# Patient Record
Sex: FEMALE | Race: WHITE | NOT HISPANIC OR LATINO | Employment: FULL TIME | URBAN - METROPOLITAN AREA
[De-identification: names, ages, dates, MRNs, and addresses within clinical notes are randomized per-mention and may not be internally consistent; named-entity substitution may affect disease eponyms.]

---
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
## 2018-01-03 ENCOUNTER — COMMUNICATION - HEALTHEAST (OUTPATIENT)
Dept: SCHEDULING | Facility: CLINIC | Age: 18
End: 2018-01-03

## 2018-01-04 ENCOUNTER — OFFICE VISIT - HEALTHEAST (OUTPATIENT)
Dept: FAMILY MEDICINE | Facility: CLINIC | Age: 18
End: 2018-01-04

## 2018-01-04 DIAGNOSIS — R09.81 NASAL CONGESTION: ICD-10-CM

## 2018-01-04 DIAGNOSIS — J06.9 VIRAL URI: ICD-10-CM

## 2018-01-04 DIAGNOSIS — L50.9 HIVES: ICD-10-CM

## 2018-04-03 ENCOUNTER — RECORDS - HEALTHEAST (OUTPATIENT)
Dept: ADMINISTRATIVE | Facility: OTHER | Age: 18
End: 2018-04-03

## 2018-04-09 ENCOUNTER — COMMUNICATION - HEALTHEAST (OUTPATIENT)
Dept: FAMILY MEDICINE | Facility: CLINIC | Age: 18
End: 2018-04-09

## 2019-01-14 ENCOUNTER — OFFICE VISIT - HEALTHEAST (OUTPATIENT)
Dept: FAMILY MEDICINE | Facility: CLINIC | Age: 19
End: 2019-01-14

## 2019-01-14 DIAGNOSIS — J45.909 ASTHMA: ICD-10-CM

## 2019-01-14 DIAGNOSIS — R10.2 PELVIC PAIN IN FEMALE: ICD-10-CM

## 2019-01-14 DIAGNOSIS — N91.2 AMENORRHEA: ICD-10-CM

## 2019-01-14 DIAGNOSIS — J45.21 MILD INTERMITTENT ASTHMA WITH EXACERBATION: ICD-10-CM

## 2019-01-14 LAB
ALBUMIN UR-MCNC: NEGATIVE MG/DL
APPEARANCE UR: CLEAR
BILIRUB UR QL STRIP: NEGATIVE
CLUE CELLS: NORMAL
COLOR UR AUTO: YELLOW
FSH SERPL-ACNC: 4.5 MIU/ML
GLUCOSE UR STRIP-MCNC: NEGATIVE MG/DL
HCG UR QL: NEGATIVE
HGB UR QL STRIP: NEGATIVE
KETONES UR STRIP-MCNC: ABNORMAL MG/DL
LEUKOCYTE ESTERASE UR QL STRIP: NEGATIVE
NITRATE UR QL: NEGATIVE
PH UR STRIP: 5.5 [PH] (ref 5–8)
PROLACTIN SERPL-MCNC: 13.6 NG/ML (ref 0–20)
SP GR UR STRIP: 1.03 (ref 1–1.03)
SP GR UR STRIP: >=1.03 (ref 1–1.03)
TRICHOMONAS, WET PREP: NORMAL
TSH SERPL DL<=0.005 MIU/L-ACNC: 1.4 UIU/ML (ref 0.3–5)
UROBILINOGEN UR STRIP-ACNC: ABNORMAL
YEAST, WET PREP: NORMAL

## 2019-01-14 RX ORDER — ALBUTEROL SULFATE 90 UG/1
AEROSOL, METERED RESPIRATORY (INHALATION)
Qty: 8.5 G | Refills: 0 | Status: SHIPPED | OUTPATIENT
Start: 2019-01-14

## 2019-01-15 ENCOUNTER — COMMUNICATION - HEALTHEAST (OUTPATIENT)
Dept: FAMILY MEDICINE | Facility: CLINIC | Age: 19
End: 2019-01-15

## 2019-01-15 LAB
C TRACH DNA SPEC QL PROBE+SIG AMP: NEGATIVE
N GONORRHOEA DNA SPEC QL NAA+PROBE: NEGATIVE

## 2019-01-17 LAB — TESTOST SERPL-MCNC: 26 NG/DL (ref 8–60)

## 2019-01-18 ENCOUNTER — COMMUNICATION - HEALTHEAST (OUTPATIENT)
Dept: FAMILY MEDICINE | Facility: CLINIC | Age: 19
End: 2019-01-18

## 2021-05-30 VITALS — WEIGHT: 221.4 LBS | HEIGHT: 65 IN | BODY MASS INDEX: 36.89 KG/M2

## 2021-05-30 VITALS — HEIGHT: 64 IN | WEIGHT: 224.06 LBS | BODY MASS INDEX: 38.25 KG/M2

## 2021-05-31 VITALS — BODY MASS INDEX: 39.04 KG/M2 | WEIGHT: 236.4 LBS

## 2021-06-02 VITALS — WEIGHT: 227.8 LBS | BODY MASS INDEX: 37.62 KG/M2

## 2021-06-09 NOTE — PROGRESS NOTES
ASSESSMENT:  1. Mild persistent asthma without complication  Patient with history of asthma not under control at this time. Last fall we attempt to start her on a steroid inhaler and she could not afford it. We contacted insurance company and found the ones that work on the formulary but the co-pay was still over $80. Patient currently is using albuterol as needed. Allergies seem to be her bring trigger for asthma sore for her to allergy clinic for testing and possible treatment. Discussed case with Pharm.D. Jalil Burrell and he recommended low dose Symbicort so will have her use Symbicort one puff daily and he had a sample to offer her.  - Ambulatory referral to Allergy    2. Encounter for other contraceptive management  Patient with a missed menses will check a urine pregnancy test today. We discussed other forms birth control. Patient does not feel she can reliably take a pill that daily. She is unwilling to place a maneuvering vaginally and does not want anything inside her. She refuses to consider the IUD. Also is not interested in Nexplanon. Is willing to try the Depo-Provera shot. If pregnancy test is negative today will start double at this point she'll follow-up in 3 months for repeat injection  - medroxyPROGESTERone injection 150 mg (DEPO-PROVERA); Inject 1 mL (150 mg total) into the shoulder, thigh, or buttocks once.  - medroxyPROGESTERone injection 150 mg (DEPO-PROVERA); Inject 1 mL (150 mg total) into the shoulder, thigh, or buttocks every 3 (three) months.    3. Missed menses  Missed menses pregnancy test today was negative  - Pregnancy (Beta-hCG, Qual), Urine    4. Allergic rhinitis  Patient with a history of allergic triggers for her asthma. Will refer her to allergy clinic for testing and possible treatment suggestions  - Ambulatory referral to Allergy    PLAN:  There are no Patient Instructions on file for this visit.    Orders Placed This Encounter   Procedures     Pregnancy (Beta-hCG, Qual), Urine  "    Ambulatory referral to Allergy     Referral Priority:   Routine     Referral Type:   Consultation     Referral Reason:   Evaluation and Treatment     Requested Specialty:   Allergy     Number of Visits Requested:   1     Medications Discontinued During This Encounter   Medication Reason     mometasone 200 mcg/actuation HFAA Therapy completed     fluticasone (FLOVENT HFA) 110 mcg/actuation inhaler Therapy completed     Administrations This Visit     medroxyPROGESTERone injection 150 mg (DEPO-PROVERA)     Admin Date Action Dose Route Administered By             04/06/2017 Given 150 mg Intramuscular Ava Melissa CMA                        No Follow-up on file.    CHIEF COMPLAINT:  Chief Complaint   Patient presents with     Contraception     Discuss contraception options; Hx OCP use- Pt non compliant      Contraception     Current use ortho evra patch, \"patch is gross\" per Pt       HISTORY OF PRESENT ILLNESS:  Sherrill is a 16 y.o. female presenting to the clinic today to discuss contraception options. She is accompanied by her male partner.    Contraception: She is currently prescribed the Ortho Evra patch for contraception. She claims it itches often. She has tried applying it to both buttocks and her shoulder without improvement. She also notes it tends to peel off while in the shower. She has not been using her patch recently due to the itchiness. She is supposed to be on her menstrual period at this time but it has not yet started. She notes her menstrual period has consistently been a couple days late since beginning birth control. She denies any mood swings or changes while previously on the pill or patch. She uses tampons and occasionally has anxiety about using them so she does not want to use NuvaRing or an IUD. She is agreeable to receiving an injection every 3 months.  Pt menses is late this month.    Asthma: Her asthma has not been well-controlled. She has not been using her controller inhalers " "regularly. She tried taking Singulair last fall but discontinued use because it caused an itchy rash. She was prescribed Flovent but never began taking it because she could not afford it. She usually develops allergies in the spring. She last saw an allergist 6 years ago but has never received allergy injections. She currently takes cetirizine daily with mild relief.    ACT Total Score: 17 (4/6/2017  9:00 AM)    REVIEW OF SYSTEMS:   She has a history of anxiety. All other systems are negative.    PFSH:  No other pertinent history reviewed.    No past medical history on file.    Family History   Problem Relation Age of Onset     Asthma Mother      Asthma Brother      Asthma Maternal Grandmother      Cancer Maternal Grandfather      skin cancer     No past surgical history on file.    TOBACCO USE:  History   Smoking Status     Current Some Day Smoker   Smokeless Tobacco     Not on file     Comment: parents     VITALS:  Vitals:    04/06/17 0934   BP: 118/64   Pulse: 72   Resp: 16   Weight: (!) 224 lb 1 oz (101.6 kg)   Height: 5' 4.25\" (1.632 m)     Wt Readings from Last 3 Encounters:   04/06/17 (!) 224 lb 1 oz (101.6 kg) (99 %, Z= 2.24)*   11/02/16 205 lb (93 kg) (98 %, Z= 2.04)*   08/24/16 193 lb 8 oz (87.8 kg) (97 %, Z= 1.89)*     * Growth percentiles are based on [facility] 2-20 Years data.     Body mass index is 38.16 kg/(m^2).    PHYSICAL EXAM:  General Appearance: Alert, cooperative, no distress, appears stated age  Neck: Supple, symmetrical, trachea midline, no adenopathy;  thyroid: not enlarged, symmetric, no tenderness/mass/nodules  Lungs: Clear to auscultation bilaterally, respirations unlabored  Heart: Regular rate and rhythm, S1 and S2 normal, no murmur, rub, or gallop  Lymph nodes: Cervical, supraclavicular, and axillary nodes normal  Psychiatric: Normal mood and affect.     ADDITIONAL HISTORY SUMMARIZED (2): None.  DECISION TO OBTAIN EXTRA INFORMATION (1): Consulted pharmacist regarding steroid inhaler. "   RADIOLOGY TESTS (1): None.  LABS (1): Ordered lab.  MEDICINE TESTS (1): None.  INDEPENDENT REVIEW (2 each): None.    The visit lasted a total of 20 minutes face to face with the patient. Over 50% of the time was spent counseling and educating the patient about her asthma management and contraception options.    ITariq, am scribing for and in the presence of, Dr. Posadas.    I, Dr. Posadas, personally performed the services described in this documentation, as scribed by Tariq Colby in my presence, and it is both accurate and complete.    MEDICATIONS:  Current Outpatient Prescriptions   Medication Sig Dispense Refill     cetirizine (ZYRTEC) 10 MG tablet Take by mouth.       hydrOXYzine (VISTARIL) 25 MG capsule Take 1 capsule (25 mg total) by mouth bedtime as needed for itching or allergies. 10 capsule 0     norelgestromin-ethinyl estradiol (ORTHO EVRA) 150-35 mcg/24 hr Place 1 patch on the skin every 7 days. 4 patch 11     PROAIR HFA 90 mcg/actuation inhaler INHALE TWO PUFFS BY MOUTH EVERY 4 HOURS AS NEEDED FOR WHEEZING, AND INHALE TWO PUFFS BEFORE CHOIR 8.5 g 0     Current Facility-Administered Medications   Medication Dose Route Frequency Provider Last Rate Last Dose     medroxyPROGESTERone injection 150 mg (DEPO-PROVERA)  150 mg Intramuscular Q3 Months Kate Posadas MD           Total data points: 2

## 2021-06-10 NOTE — PROGRESS NOTES
Assessment:    Allergic rhinitis with a specific sensitivity to birch pollen (spring), grass pollen ( summer)    Persistent asthma with incomplete control however normal spirometry and nitric oxide today.    Oral allergy syndrome    Chronic hives.  Because of seasonal association these may be related to outdoor aeroallergens.    Plan:    For Allergies, will switch nasal spray medication to Astelin 2 sprays each nostril morning and night.      For asthma, will try Symbicort 80-2 sprays twice daily.  Albuterol 2 puffs every 4 hours as needed    For hives, recommend cetirizine 10 mg or fexofenadine 180 mg morning and night.    Allergy shots can be considered however I would recommend additional intradermal allergy testing.  ____________________________________________________________________________     Sherrill is here for evaluation of allergies and asthma.  She reports having a long history starting in early childhood.  She feels that her asthma is getting worse.  It typically is worse during the spring season.  She is having active allergy symptoms including itchy watery eyes, rhinorrhea, sneezing and nasal congestion.  Her allergies are worse in the spring.   she had previous allergy testing done in 2012 which was positive for birch and grass pollen only.  She does feel that her allergies are year round however.  She uses fexofenadine 180 mg daily which helps.  She is using fluticasone nasal spray daily which she started 1 month ago.  Regarding asthma she describes shortness of breath.  Minimal coughing and wheezing.  She identifies laughter, exercise, allergies and illness as triggers.  No history of hospitalizations with asthma.  She has albuterol which she is using nearly daily.  Previously tried montelukast cast but she reported this caused hives and itching.  She has a sample of Symbicort 80 that she is doing 1 inhalation daily of.  She has a history of chronic hives.  Over the past 3-4 years she gets hives in  the spring.  Typically she will go to the emergency room and get placed on prednisone once a year.  She describes blotchy raised itchy rashes that come and go all over her body.  They do respond somewhat to antihistamines.  She describes pineapple causing itchy mouth symptoms.  It does not cause hives.    Review of symptoms:  As above, otherwise negative    Past medical history: No other chronic medical conditions noted.    Allergies: No known allergies to medication, latex, or hymenoptera venom     Family history: Mother and grandmother with allergies and asthma.  Social history: Currently she describes her house as old.  She has been in this home for 6 years.  No I t has central air-conditioning.  There is a basement.  She has cats and a dog in the home.  She has had these for years.  No cigarette smoking history.    Medications: reviewed in chart  Physical Exam:  General:  Alert and oriented.  Eyes:  Sclera clear.  Ears: TMs translucent grey with bony landmarks visible. Nose: Pale, boggy mucosal membranes.  Throat: Pink, moist.  No lesions.  Neck: Supple.  No lymphadenopathy.  Lungs: CTA.  CV: Regular rate and rhythm. Extremities: Well perfused.  No clubbing or cyanosis. Skin: No rash    Nitric oxide is 13 ppb    Spirometry: FEV1 to FVC ratio is 84%.  FEV1 is 3.4 L which is 99% of predicted.  FVC is 4 L which is 104% of predicted.  This is normal spirometry    Last Percutaneous Allergy Test Results  Trees  Nitin, White  1:20 H  (W/F in mm): 0/0 (04/13/17 1038)  Birch Mix 1:20 H (W/F in mm): 4/F (04/13/17 1038)  Burt, Common 1:20 H (W/F in mm): 0/0 (04/13/17 1038)  Elm, American 1:20 H (W/F in mm): 0/0 (04/13/17 1038)  Ferry, Shagbark 1:20 H (W/F in mm): 0/0 (04/13/17 1038)  Maple, Hard/Sugar 1:20 H (W/F in mm): 0/0 (04/13/17 1038)  [place] Mix 1:20 H (W/F in mm): 0/0 (04/13/17 1038)  Vilonia, Red 1:20 H (W/F in mm): 0/0 (04/13/17 1038)  Rohit Hamilton 1:20 H (W/F in mm): 0/0 (04/13/17 1038)  [place] Tree  1:20 H (W/F in mm): 0/0 (04/13/17 1038)  Dust Mites  D. Pteronyssinus Mite 30,000 AU/ML H (W/F in mm): 0/0 (04/13/17 1038)  D. Farinae Mite 30,000 AU/ML H (W/F in mm: 0/0 (04/13/17 1038)  Grasses  Grass Mix #4 10,000 BAU/ML H: 3/F (04/13/17 1038)  John Grass 1:20 H (W/F in mm): 0/0 (04/13/17 1038)  Cockroach  Cockroach Mix 1:10 H (W/F in mm): 0/0 (04/13/17 1038)  Molds/Fungi  Alternaria Tenuis 1:10 H (W/F in mm): 0/0 (04/13/17 1038)  Aspergillus Fumigatus 1:10 H (W/F in mm): 0/0 (04/13/17 1038)  Homodendrum Cladosporioides 1:10 H (W/F in mm): 0/0 (04/13/17 1038)  Penicillin Notatum 1:10 H (W/F in mm): 0/0 (04/13/17 1038)  Epicoccum 1:10 H (W/F in mm): 0/0 (04/13/17 1038)  Weeds  Ragweed, Short 1:20 H (W/F in mm): 0/0 (04/13/17 1038)  Dock, Sorrel 1:20 H (W/F in mm): 0/0 (04/13/17 1038)  Lamb's Quarter 1:20 H (W/F in mm): 0/0 (04/13/17 1038)  Pigweed, Rough Red Root 1:20 H  (W/F in mm): 0/0 (04/13/17 1038)  Plantain, English 1:20 H  (W/F in mm): 0/0 (04/13/17 1038)  Sagebrush, Mugwort 1:20 H  (W/F in mm): 0/0 (04/13/17 1038)  Animal  Cat 10,000 BAU/ML H (W/F in mm): 0/0 (04/13/17 1038)  Dog 1:10 H (W/F in mm): 0/0 (04/13/17 1038)  Controls  Device Type: QUINTIP (04/13/17 1038)  Neg. control: 50% Glycerine/Saline H (W/F in mm): 0/0 (04/13/17 1038)  Pos. control: Histamine 6mg/ML (W/F in mms): 8/18 (04/13/17 1038)     This transcription uses voice recognition software, which may contain typographical errors.   45 min spent in direct contact with the patient.  More than 50% in counseling and coordination of care.

## 2021-06-23 NOTE — TELEPHONE ENCOUNTER
Reason contacted:  Results  Information relayed:  MD message below  Additional questions:  No  Further follow-up needed:  No  Okay to leave a detailed message:  No

## 2021-06-23 NOTE — TELEPHONE ENCOUNTER
Left message to call back for: Test results/Lab results   Information to relay to patient:  Please relay message below from provider to patient

## 2021-06-23 NOTE — TELEPHONE ENCOUNTER
Please call patient and let her know so far all of her tests are normal.  Her Pap smear is actually back and was normal and she can repeat that in 3 years.  The only test we are waiting for his testosterone.  No evidence of infection at this time.    As a note before said please call her cell phone and did not leave any messages with her mother

## 2021-06-23 NOTE — TELEPHONE ENCOUNTER
Left message to call back for: Sherrill  Information to relay to patient:  Left message stating that not all the results are back and we will call her cell phone when results are back and Dr Posadas has reviewed them.

## 2021-06-23 NOTE — TELEPHONE ENCOUNTER
----- Message from Kate Posadas MD sent at 1/17/2019  5:50 PM CST -----  Sherrill  Your testosterone level was normal.  Your hormonal levels were normal.  And did not give us a cause for your menstrual irregularities

## 2021-06-23 NOTE — TELEPHONE ENCOUNTER
Test Results  Who is calling?:  Patient  Who ordered the test:  Kate Posadas MD  Type of test: Lab  Date of test:  1/14/2019  Where was the test performed:  Lab  What are your questions/concerns?:  Patient wanting to know lab results from yesterday and to ensure we contact her and not her mother.  Please contact patient when all lab results are back as writer let patient know some are still in process.  Okay to leave a detailed message?:  No - call [phone]

## 2021-06-23 NOTE — TELEPHONE ENCOUNTER
Patient Returning Call  Reason for call:  Results  Information relayed to patient:  Patient was read the following by writer per PCP: Please call patient and let her know so far all of her tests are normal.  Her Pap smear is actually back and was normal and she can repeat that in 3 years.  The only test we are waiting for his testosterone.  No evidence of infection at this time.  Patient has additional questions:  No  If YES, what are your questions/concerns:  NA  Okay to leave a detailed message?: No call back needed

## 2021-06-23 NOTE — PROGRESS NOTES
Assessment & Plan:  1. Amenorrhea  Patient with irregular menses.  No previous evaluation for.  Will start initial labs looking at hormonal levels, rule out pregnancy.  Patient has not had a Pap smear yet that was done today.  If everything is normal consider a progesterone challenge.  - Influenza, Seasonal,Quad Inj, 36+ MOS  - Pregnancy (Beta-hCG, Qual), Urine  - Thyroid Cascade  - Follicle Stimulating Hormone (FSH)  - Prolactin  - Gynecologic Cytology (PAP Smear)  - Testosterone,Total(TEST<21yr)    2. Pelvic pain in female  Patient with pelvic pain that is now resolved.  Check a urinalysis.  A wet prep and GC negative.  Patient to monitor symptoms if they do recur  - Urinalysis-UC if Indicated  - Chlamydia trachomatis & Neisseria gonorrhoeae, Amplified Detection  - Wet Prep, Vaginal    3. Asthma  Patient with a history of asthma needs a refill of her albuterol.  She states she has her steroid inhaler at home right now and will continue with it.  - albuterol (PROAIR HFA) 90 mcg/actuation inhaler; INHALE TWO PUFFS BY MOUTH EVERY 4 HOURS AS NEEDED FOR WHEEZING, AND INHALE TWO PUFFS BEFORE CHOIR  Dispense: 8.5 g; Refill: 0    4. Mild intermittent asthma with exacerbation  Patient with abnormal lung exam consistent with an asthma exacerbation.  Will refill her albuterol inhaler and will treat her with Z-Ash.  Patient is to follow-up if no improvement of symptoms in 48 hours.  - albuterol (PROAIR HFA) 90 mcg/actuation inhaler; INHALE TWO PUFFS BY MOUTH EVERY 4 HOURS AS NEEDED FOR WHEEZING, AND INHALE TWO PUFFS BEFORE CHOIR  Dispense: 8.5 g; Refill: 0  - azithromycin (ZITHROMAX Z-ASH) 250 MG tablet; 2 tabs (500 mg ) day #1, then 1 tab (250 mg) days #2-5, total 5 days  Dispense: 6 tablet; Refill: 0        Orders Placed This Encounter   Procedures     Chlamydia trachomatis & Neisseria gonorrhoeae, Amplified Detection     Order Specific Question:   Specimen Source?     Answer:   Endocervix     Wet Prep, Vaginal      Influenza, Seasonal,Quad Inj, 36+ MOS     Pregnancy (Beta-hCG, Qual), Urine     Thyroid Cascade     Follicle Stimulating Hormone (FSH)     Prolactin     Urinalysis-UC if Indicated     Testosterone,Total(TEST<21yr)     Medications Discontinued During This Encounter   Medication Reason     hydrOXYzine pamoate (VISTARIL) 25 MG capsule Therapy completed     pseudoephedrine (SUDAFED) 30 MG tablet Therapy completed     PROAIR HFA 90 mcg/actuation inhaler Reorder       Return in about 3 months (around 4/14/2019) for Recheck.          This note was created use Dragon Dictation.  There may be sound alike errors present.       Chief Complaint:   Chief Complaint   Patient presents with     Vaginal Pain     vaginal cramping, x 3 days, pt states pain has subsided now     Wheezing     pt c/o vomiting over the weekend, having wheezing in chest, check lungs today       History of Present Illness:  Sherrill is a 18 y.o. female presenting to the clinic today for numerous complaints.  First she had some pelvic cramping last week.  It lasted about 3 days.  It hurts to walk her to have intercourse.  She denies any discharge no odor and it is now gone.  Since then she has had a little bit of brown spotting.  Patient is in a monogamous relationship and does not feel she has been exposed to chlamydia or gonorrhea    Since stopping the Depo shot patient has not had a menses.  Patient states she has never had regular menses.  But since the Dep0 she is not had anything regular.  She does not desire to get pregnant at this time but is not currently using any contraception.   missed menses.  she is also had no evaluation of the missed menses     patient also has a history of asthma.  She has run out of her albuterol inhaler but continues to have her steroid inhaler.  She does not take inhaler regularly only as needed.  Patient states that she has been having more wheezing recently as well as some cough and shortness of breath.  Her asthma is  usually triggered with URI symptoms.      Reviewed  Review of Systems:  All other systems are negative.     PFSH:  Reviewed    Tobacco Use:  Social History     Tobacco Use   Smoking Status Former Smoker   Smokeless Tobacco Never Used   Tobacco Comment    passive smoke exposure       Vitals:  Vitals:    01/14/19 1329   BP: 115/76   Patient Site: Left Arm   Patient Position: Sitting   Cuff Size: Adult Large   Pulse: (!) 103   Resp: 16   Temp: 98.6  F (37  C)   TempSrc: Oral   SpO2: 98%   Weight: (!) 227 lb 12.8 oz (103.3 kg)     Wt Readings from Last 3 Encounters:   01/14/19 (!) 227 lb 12.8 oz (103.3 kg)   01/04/18 (!) 236 lb 6.4 oz (107.2 kg) (>99 %, Z= 2.38)*   04/13/17 (!) 221 lb 6.4 oz (100.4 kg) (99 %, Z= 2.22)*     * Growth percentiles are based on [facility] (Girls, 2-20 Years) data.     Body mass index is 37.62 kg/m .      Physical Exam:  Constitutional:  Reveals an alert, cooperative,  female in no acute distress.  Vitals:  Per nursing notes.  Ears:  Clear.  Oropharynx:  Without posterior nasal drainage or thrush.  Neck:  Supple, no lymphadenopathy,  thyroid not palpable.  Cardiac:  Regular rate and rhythm without murmurs, rubs, or gallops.   Lungs: rhonci left upper lobe some expiratory wheezing throughout lung fields       Abdomen:  non-tender, non-distended.  Neither liver nor spleen palpable.   - no discha dischargerge, wet prep and pap.  Cervix normal.  Non tender on exam - ovaries normal.   Psychiatric:  Mood appropriate, memory intact.     Data Reviewed:  Additional history summarized (from old records or history from someone other than the patient or another healthcare provider) (2 TOTAL): None.     Decision to obtain extra information (old records requested or history from another person or accessing Care Everywhere) (1 TOTAL): None.     Radiology tests summarized or ordered (XRAY/CT/MRI/DXA) (1 TOTAL): None.    Labs reviewed or ordered (1 TOTAL): 1 labs ordered.    Medicine tests summarized or ordered  (EKG/ECHO) (1 TOTAL): None.    Independent review of EKG or X-Ray (2 EACH): None.       The visit lasted a total of 26 minutes face to face with the patient. Over 50% of the time was spent counseling and educating the patient about menstrual irregularity.        Medications:  Current Outpatient Medications   Medication Sig Dispense Refill     albuterol (PROAIR HFA) 90 mcg/actuation inhaler INHALE TWO PUFFS BY MOUTH EVERY 4 HOURS AS NEEDED FOR WHEEZING, AND INHALE TWO PUFFS BEFORE CHOIR 8.5 g 0     budesonide-formoterol (SYMBICORT) 80-4.5 mcg/actuation inhaler Inhale 2 puffs 2 (two) times a day. 1 Inhaler 6     cetirizine (ZYRTEC) 10 MG tablet Take by mouth.       azithromycin (ZITHROMAX Z-ASH) 250 MG tablet 2 tabs (500 mg ) day #1, then 1 tab (250 mg) days #2-5, total 5 days 6 tablet 0     No current facility-administered medications for this visit.        Total Data Points:

## 2022-09-12 ENCOUNTER — LAB REQUISITION (OUTPATIENT)
Dept: LAB | Facility: CLINIC | Age: 22
End: 2022-09-12
Payer: COMMERCIAL

## 2022-09-12 DIAGNOSIS — Z12.4 ENCOUNTER FOR SCREENING FOR MALIGNANT NEOPLASM OF CERVIX: ICD-10-CM

## 2022-09-12 DIAGNOSIS — N91.5 OLIGOMENORRHEA, UNSPECIFIED: ICD-10-CM

## 2022-09-12 DIAGNOSIS — Z13.1 ENCOUNTER FOR SCREENING FOR DIABETES MELLITUS: ICD-10-CM

## 2022-09-12 LAB
HBA1C MFR BLD: 5.6 %
HOLD SPECIMEN: NORMAL
TSH SERPL DL<=0.005 MIU/L-ACNC: 2.81 UIU/ML (ref 0.3–4.2)

## 2022-09-12 PROCEDURE — 84270 ASSAY OF SEX HORMONE GLOBUL: CPT | Mod: ORL | Performed by: OBSTETRICS & GYNECOLOGY

## 2022-09-12 PROCEDURE — 84146 ASSAY OF PROLACTIN: CPT | Mod: ORL | Performed by: OBSTETRICS & GYNECOLOGY

## 2022-09-12 PROCEDURE — 83498 ASY HYDROXYPROGESTERONE 17-D: CPT | Mod: ORL | Performed by: OBSTETRICS & GYNECOLOGY

## 2022-09-12 PROCEDURE — G0145 SCR C/V CYTO,THINLAYER,RESCR: HCPCS | Mod: ORL | Performed by: OBSTETRICS & GYNECOLOGY

## 2022-09-12 PROCEDURE — 82627 DEHYDROEPIANDROSTERONE: CPT | Mod: ORL | Performed by: OBSTETRICS & GYNECOLOGY

## 2022-09-12 PROCEDURE — 84443 ASSAY THYROID STIM HORMONE: CPT | Mod: ORL | Performed by: OBSTETRICS & GYNECOLOGY

## 2022-09-12 PROCEDURE — 84403 ASSAY OF TOTAL TESTOSTERONE: CPT | Mod: ORL | Performed by: OBSTETRICS & GYNECOLOGY

## 2022-09-12 PROCEDURE — 83036 HEMOGLOBIN GLYCOSYLATED A1C: CPT | Mod: ORL | Performed by: OBSTETRICS & GYNECOLOGY

## 2022-09-13 LAB
DHEA-S SERPL-MCNC: 192 UG/DL (ref 35–430)
PROLACTIN SERPL 3RD IS-MCNC: 16 NG/ML (ref 5–23)
SHBG SERPL-SCNC: 27 NMOL/L (ref 30–135)

## 2022-09-14 LAB
BKR LAB AP GYN ADEQUACY: NORMAL
BKR LAB AP GYN INTERPRETATION: NORMAL
BKR LAB AP HPV REFLEX: NORMAL
BKR LAB AP LMP: NORMAL
BKR LAB AP PREVIOUS ABNL DX: NORMAL
BKR LAB AP PREVIOUS ABNORMAL: NORMAL
PATH REPORT.COMMENTS IMP SPEC: NORMAL
PATH REPORT.COMMENTS IMP SPEC: NORMAL
PATH REPORT.RELEVANT HX SPEC: NORMAL

## 2022-09-15 LAB
TESTOST FREE SERPL-MCNC: 1.07 NG/DL
TESTOST SERPL-MCNC: 53 NG/DL (ref 8–60)

## 2022-09-20 LAB — 17OHP SERPL-MCNC: 34 NG/DL

## 2023-05-24 ENCOUNTER — HOSPITAL ENCOUNTER (OUTPATIENT)
Dept: RADIOLOGY | Facility: HOSPITAL | Age: 23
Discharge: HOME | End: 2023-05-24
Attending: STUDENT IN AN ORGANIZED HEALTH CARE EDUCATION/TRAINING PROGRAM | Admitting: RADIOLOGY
Payer: COMMERCIAL

## 2023-05-24 PROCEDURE — 58340 CATHETER FOR HYSTEROGRAPHY: CPT

## 2023-05-24 PROCEDURE — 255N000002 HC RX 255 OP 636: Performed by: STUDENT IN AN ORGANIZED HEALTH CARE EDUCATION/TRAINING PROGRAM

## 2023-05-24 RX ADMIN — IOHEXOL 30 ML: 300 INJECTION, SOLUTION INTRAVENOUS at 14:04

## 2023-10-05 ENCOUNTER — OFFICE VISIT (OUTPATIENT)
Dept: SURGERY | Facility: CLINIC | Age: 23
End: 2023-10-05
Payer: COMMERCIAL

## 2023-10-05 ENCOUNTER — LAB (OUTPATIENT)
Dept: LAB | Facility: CLINIC | Age: 23
End: 2023-10-05
Payer: COMMERCIAL

## 2023-10-05 VITALS
HEIGHT: 64 IN | BODY MASS INDEX: 40.55 KG/M2 | SYSTOLIC BLOOD PRESSURE: 124 MMHG | WEIGHT: 237.5 LBS | DIASTOLIC BLOOD PRESSURE: 82 MMHG

## 2023-10-05 DIAGNOSIS — E66.01 MORBID OBESITY (H): ICD-10-CM

## 2023-10-05 DIAGNOSIS — E66.01 MORBID OBESITY (H): Primary | ICD-10-CM

## 2023-10-05 DIAGNOSIS — N97.9 FEMALE INFERTILITY: ICD-10-CM

## 2023-10-05 DIAGNOSIS — E28.2 PCOS (POLYCYSTIC OVARIAN SYNDROME): ICD-10-CM

## 2023-10-05 LAB
ALBUMIN SERPL BCG-MCNC: 4.6 G/DL (ref 3.5–5.2)
ALP SERPL-CCNC: 75 U/L (ref 35–104)
ALT SERPL W P-5'-P-CCNC: 21 U/L (ref 0–50)
ANION GAP SERPL CALCULATED.3IONS-SCNC: 11 MMOL/L (ref 7–15)
AST SERPL W P-5'-P-CCNC: 27 U/L (ref 0–45)
BILIRUB SERPL-MCNC: 0.3 MG/DL
BUN SERPL-MCNC: 16 MG/DL (ref 6–20)
CALCIUM SERPL-MCNC: 9.4 MG/DL (ref 8.6–10)
CHLORIDE SERPL-SCNC: 105 MMOL/L (ref 98–107)
CREAT SERPL-MCNC: 0.94 MG/DL (ref 0.51–0.95)
DEPRECATED HCO3 PLAS-SCNC: 25 MMOL/L (ref 22–29)
EGFRCR SERPLBLD CKD-EPI 2021: 86 ML/MIN/1.73M2
GLUCOSE SERPL-MCNC: 100 MG/DL (ref 70–99)
POTASSIUM SERPL-SCNC: 3.7 MMOL/L (ref 3.4–5.3)
PROT SERPL-MCNC: 7.5 G/DL (ref 6.4–8.3)
SODIUM SERPL-SCNC: 141 MMOL/L (ref 135–145)

## 2023-10-05 PROCEDURE — 36415 COLL VENOUS BLD VENIPUNCTURE: CPT

## 2023-10-05 PROCEDURE — 99205 OFFICE O/P NEW HI 60 MIN: CPT | Performed by: FAMILY MEDICINE

## 2023-10-05 PROCEDURE — 80053 COMPREHEN METABOLIC PANEL: CPT

## 2023-10-05 RX ORDER — METFORMIN HCL 500 MG
500 TABLET, EXTENDED RELEASE 24 HR ORAL DAILY
COMMUNITY
End: 2024-09-17

## 2023-10-05 RX ORDER — CETIRIZINE HYDROCHLORIDE 10 MG/1
20 TABLET ORAL DAILY
COMMUNITY

## 2023-10-05 RX ORDER — ALBUTEROL SULFATE 90 UG/1
2 AEROSOL, METERED RESPIRATORY (INHALATION) EVERY 6 HOURS PRN
COMMUNITY
End: 2024-09-17

## 2023-10-05 RX ORDER — PHENTERMINE HYDROCHLORIDE 37.5 MG/1
TABLET ORAL
Qty: 90 TABLET | Refills: 0 | Status: SHIPPED | OUTPATIENT
Start: 2023-10-05 | End: 2024-09-17

## 2023-10-05 RX ORDER — VITAMIN B COMPLEX
2000 TABLET ORAL DAILY
COMMUNITY
End: 2024-09-17

## 2023-10-05 RX ORDER — FLUTICASONE PROPIONATE 50 MCG
SPRAY, SUSPENSION (ML) NASAL
COMMUNITY
Start: 2023-04-26 | End: 2024-09-17

## 2023-10-05 NOTE — PROGRESS NOTES
HPI:  Sherrill Du is a 23 year old year old female who I was asked to see by Dr. Chaudhari for medical bariatric consultation. Weight related co-morbidities include There is no problem list on file for this patient.  .  Her BMI is Body mass index is 41.41 kg/m ..      Assessment/ Plan:  Sherrill is a 23 year old year old female who presents for medical weight management.     Diagnosis Comments   1. Morbid obesity (H)  Comprehensive metabolic panel  We discussed healthy habits to assist with weight loss. She will work on planning meals ahead of time using the plate method for portion control and macronutrient proportions. She will continue using LoseIt to journal. She will try to add some muscle building activity. We discussed medication that may assist with weight loss. phentermine was prescribed. Risks/ benefits and possible side effects were discussed and questions were answered. Written information was given.        2. PCOS (polycystic ovarian syndrome)  This may improve with healthy habits and weight loss.       3. Female infertility  This may improve with healthy habits and weight loss.             Follow up: in 1 month with the dietician and in 3 months with myself       Total time spent on the date of this encounter doing: chart review, review of test results, patient visit, physical exam, education, counseling, developing plan of care and documenting = 66 minutes.     RADHA Barker MD  [facility]          Counseling:  We discussed [facility] Bariatric Basics including:  -eating 3 meals daily  -eating protein first  -eating slowly, chewing food well  -avoiding/limiting calorie containing beverages  -choosing wheat, not white with breads, crackers, pastas, berenice, bagels, tortillas, rice  -limiting carbohydrates in general  -limiting restaurant or cafeteria eating to twice a week or less    We discussed the importance of restorative sleep and stress management in maintaining a healthy  weight.    We reviewed medications associated with weight gain.    We discussed insulin resistance and glycemic index as it relates to appetite and weight control.     We discussed the National Weight Control Registry healthy weight maintenance strategies and ways to optimize metabolism.  We discussed the importance of physical activity including cardiovascular and strength training in maintaining a healthier weight and explored viable options.        History Surrouding Consultation:  She has had several past supervised and unsupervised weight loss attempts  Unfortunately there was not durable weight maintenance.  History of bulimia, anorexia, or binge eating disorder? no  If Present has eating disorder been in remission at least 3 years? na    She has lost weight over the past 2 months. She has been making better choices with what she eats. Portions have also decreased.    Dietary History  Meals per day: 3  Snacks: snacks with kids at   Typical Snack: beef stick and yogurt, crackers    A typical meal includes: B: egg sandwich (english muffin and egg and Angolan vaughan and cheese) or egg burrito or oatmeal and fruit  L: chicken and rice and cauliflower rice and broccoli or salad (usually protein and vegetable)  D: protein and vegetable and starch  Sugared beverages: starbucks coffee 0-1 per week  Caffeine: Celcius  Amount of restaurant eating per week: 0-1  Water per day: working on it, now  oz per day    Physical Activity Patterns  Current physical activity routine includes: She works 2 jobs: at a  and at a [employer] place, she gets over 10,000 steps    Limitations from being physically active on a regular basis includes: time        PMH:No past medical history on file.    Past Surgical Hx:No past surgical history on file.    Medication:  Current Outpatient Medications   Medication    albuterol (PROAIR HFA/PROVENTIL HFA/VENTOLIN HFA) 108 (90 Base) MCG/ACT inhaler    cetirizine (ZYRTEC) 10 MG tablet     fluticasone (FLONASE) 50 MCG/ACT nasal spray    metFORMIN (GLUCOPHAGE XR) 500 MG 24 hr tablet    Prenatal Vit-Fe Fumarate-FA (PRENATAL MULTIVITAMIN  PLUS IRON) 27-1 MG TABS    UNABLE TO FIND    Vitamin D3 (VITAMIN D, CHOLECALCIFEROL,) 25 mcg (1000 units) tablet     No current facility-administered medications for this visit.       Allergies:Patient has no known allergies.    Family Hx:No family history on file.    Social Hx:  Social History     Socioeconomic History    Marital status:      Spouse name: Not on file    Number of children: Not on file    Years of education: Not on file    Highest education level: Not on file   Occupational History    Not on file   Tobacco Use    Smoking status: Not on file    Smokeless tobacco: Not on file   Substance and Sexual Activity    Alcohol use: Not on file    Drug use: Not on file    Sexual activity: Not on file   Other Topics Concern    Not on file   Social History Narrative    Not on file     Social Determinants of Health     Financial Resource Strain: Not on file   Food Insecurity: Not on file   Transportation Needs: Not on file   Physical Activity: Not on file   Stress: Not on file   Social Connections: Not on file   Interpersonal Safety: Not on file   Housing Stability: Not on file       Tobacco Use Hx:  History   Smoking Status    Not on file   Smokeless Tobacco    Not on file       ROS  General  Fatigue: late afternoon  Sleep: 5-6 hours per night  Sleep Quality:good  HEENT  Hx of glaucoma: no  Vision changes: no  Cardiovascular  Chest Pain with Exertion: no  Palpitations: no  Hx of heart disease: no  Pulmonary  Shortness of breath at rest: no  Shortness of breath with exertion: yes  Snoring: yes  Stop-bang score: not done  Millbury Score: not done  Psychiatric  Moods Stable: some irritability  Endocrine  Polydipsia: no  Personal or family history of medullary thyroid cancer: no  Neurologic:  Hx of seizures: no    History of kidney stones: no  History of  "kidney disease: no  Current birth control: none    /82 (BP Location: Right arm, Patient Position: Sitting)   Ht 1.613 m (5' 3.5\")   Wt 107.7 kg (237 lb 8 oz)   BMI 41.41 kg/m    Wt Readings from Last 2 Encounters:   10/05/23 107.7 kg (237 lb 8 oz)     Body mass index is 41.41 kg/m .        Physical Exam:    Wt Readings from Last 4 Encounters:   10/05/23 107.7 kg (237 lb 8 oz)          GEN: Alert and oriented in no acute distress.   HEENT: mucous membranes moist  CV: RRR no MRG  ABDOMEN: mild protuberance     Labs:    No results found for: WBC, HGB, HCT, MCV, PLT  No results found for: CHOL  No results found for: HDL  No components found for: LDLCALC  No results found for: TRIG  No results found for: CHOLHDL  No results found for: ALT, AST, GGT, ALKPHOS, BILITOT  No results found for: HGBA1C  No components found for: [id number]  "

## 2023-10-05 NOTE — PATIENT INSTRUCTIONS

## 2023-10-05 NOTE — LETTER
10/5/2023         RE: Sherrill Du  [address] [address]        Dear Colleague,    Thank you for referring your patient, Sherrill Du, to the [facility]ARE [place]. Please see a copy of my visit note below.    HPI:  Sherrill Du is a 23 year old year old female who I was asked to see by Dr. Chaudhari for medical bariatric consultation. Weight related co-morbidities include There is no problem list on file for this patient.  .  Her BMI is Body mass index is 41.41 kg/m ..      Assessment/ Plan:  Sherrill is a 23 year old year old female who presents for medical weight management.     Diagnosis Comments   1. Morbid obesity (H)  Comprehensive metabolic panel  We discussed healthy habits to assist with weight loss. She will work on planning meals ahead of time using the plate method for portion control and macronutrient proportions. She will continue using LoseIt to journal. She will try to add some muscle building activity. We discussed medication that may assist with weight loss. phentermine was prescribed. Risks/ benefits and possible side effects were discussed and questions were answered. Written information was given.        2. PCOS (polycystic ovarian syndrome)  This may improve with healthy habits and weight loss.       3. Female infertility  This may improve with healthy habits and weight loss.             Follow up: in 1 month with the dietician and in 3 months with myself       Total time spent on the date of this encounter doing: chart review, review of test results, patient visit, physical exam, education, counseling, developing plan of care and documenting = 66 minutes.     RADHA Barker MD  [facility]          Counseling:  We discussed [facility] Bariatric Basics including:  -eating 3 meals daily  -eating protein first  -eating slowly, chewing food well  -avoiding/limiting calorie containing beverages  -choosing  wheat, not white with breads, crackers, pastas, berenice, bagels, tortillas, rice  -limiting carbohydrates in general  -limiting restaurant or cafeteria eating to twice a week or less    We discussed the importance of restorative sleep and stress management in maintaining a healthy weight.    We reviewed medications associated with weight gain.    We discussed insulin resistance and glycemic index as it relates to appetite and weight control.     We discussed the National Weight Control Registry healthy weight maintenance strategies and ways to optimize metabolism.  We discussed the importance of physical activity including cardiovascular and strength training in maintaining a healthier weight and explored viable options.        History Surrouding Consultation:  She has had several past supervised and unsupervised weight loss attempts  Unfortunately there was not durable weight maintenance.  History of bulimia, anorexia, or binge eating disorder? no  If Present has eating disorder been in remission at least 3 years? na    She has lost weight over the past 2 months. She has been making better choices with what she eats. Portions have also decreased.    Dietary History  Meals per day: 3  Snacks: snacks with kids at   Typical Snack: beef stick and yogurt, crackers    A typical meal includes: B: egg sandwich (english muffin and egg and Hong Konger vaughan and cheese) or egg burrito or oatmeal and fruit  L: chicken and rice and cauliflower rice and broccoli or salad (usually protein and vegetable)  D: protein and vegetable and starch  Sugared beverages: starbucks coffee 0-1 per week  Caffeine: Celcius  Amount of restaurant eating per week: 0-1  Water per day: working on it, now  oz per day    Physical Activity Patterns  Current physical activity routine includes: She works 2 jobs: at a  and at a [employer], she gets over 10,000 steps    Limitations from being physically active on a regular basis includes:  time        PMH:No past medical history on file.    Past Surgical Hx:No past surgical history on file.    Medication:  Current Outpatient Medications   Medication     albuterol (PROAIR HFA/PROVENTIL HFA/VENTOLIN HFA) 108 (90 Base) MCG/ACT inhaler     cetirizine (ZYRTEC) 10 MG tablet     fluticasone (FLONASE) 50 MCG/ACT nasal spray     metFORMIN (GLUCOPHAGE XR) 500 MG 24 hr tablet     Prenatal Vit-Fe Fumarate-FA (PRENATAL MULTIVITAMIN  PLUS IRON) 27-1 MG TABS     UNABLE TO FIND     Vitamin D3 (VITAMIN D, CHOLECALCIFEROL,) 25 mcg (1000 units) tablet     No current facility-administered medications for this visit.       Allergies:Patient has no known allergies.    Family Hx:No family history on file.    Social Hx:  Social History     Socioeconomic History     Marital status:      Spouse name: Not on file     Number of children: Not on file     Years of education: Not on file     Highest education level: Not on file   Occupational History     Not on file   Tobacco Use     Smoking status: Not on file     Smokeless tobacco: Not on file   Substance and Sexual Activity     Alcohol use: Not on file     Drug use: Not on file     Sexual activity: Not on file   Other Topics Concern     Not on file   Social History Narrative     Not on file     Social Determinants of Health     Financial Resource Strain: Not on file   Food Insecurity: Not on file   Transportation Needs: Not on file   Physical Activity: Not on file   Stress: Not on file   Social Connections: Not on file   Interpersonal Safety: Not on file   Housing Stability: Not on file       Tobacco Use Hx:  History   Smoking Status     Not on file   Smokeless Tobacco     Not on file       ROS  General  Fatigue: late afternoon  Sleep: 5-6 hours per night  Sleep Quality:good  HEENT  Hx of glaucoma: no  Vision changes: no  Cardiovascular  Chest Pain with Exertion: no  Palpitations: no  Hx of heart disease: no  Pulmonary  Shortness of breath at rest: no  Shortness of breath  "with exertion: yes  Snoring: yes  Stop-bang score: not done  Vesta Score: not done  Psychiatric  Moods Stable: some irritability  Endocrine  Polydipsia: no  Personal or family history of medullary thyroid cancer: no  Neurologic:  Hx of seizures: no    History of kidney stones: no  History of kidney disease: no  Current birth control: none    /82 (BP Location: Right arm, Patient Position: Sitting)   Ht 1.613 m (5' 3.5\")   Wt 107.7 kg (237 lb 8 oz)   BMI 41.41 kg/m    Wt Readings from Last 2 Encounters:   10/05/23 107.7 kg (237 lb 8 oz)     Body mass index is 41.41 kg/m .        Physical Exam:    Wt Readings from Last 4 Encounters:   10/05/23 107.7 kg (237 lb 8 oz)          GEN: Alert and oriented in no acute distress.   HEENT: mucous membranes moist  CV: RRR no MRG  ABDOMEN: mild protuberance     Labs:    No results found for: WBC, HGB, HCT, MCV, PLT  No results found for: CHOL  No results found for: HDL  No components found for: LDLCALC  No results found for: TRIG  No results found for: CHOLHDL  No results found for: ALT, AST, GGT, ALKPHOS, BILITOT  No results found for: HGBA1C  No components found for: [id number]      Again, thank you for allowing me to participate in the care of your patient.        Sincerely,        RADHA Barker MD  "

## 2023-10-11 ENCOUNTER — VIRTUAL VISIT (OUTPATIENT)
Dept: SURGERY | Facility: CLINIC | Age: 23
End: 2023-10-11
Payer: COMMERCIAL

## 2023-10-11 DIAGNOSIS — E66.01 CLASS 3 SEVERE OBESITY DUE TO EXCESS CALORIES WITHOUT SERIOUS COMORBIDITY WITH BODY MASS INDEX (BMI) OF 40.0 TO 44.9 IN ADULT (H): Primary | ICD-10-CM

## 2023-10-11 DIAGNOSIS — E66.813 CLASS 3 SEVERE OBESITY DUE TO EXCESS CALORIES WITHOUT SERIOUS COMORBIDITY WITH BODY MASS INDEX (BMI) OF 40.0 TO 44.9 IN ADULT (H): Primary | ICD-10-CM

## 2023-10-11 DIAGNOSIS — Z71.3 NUTRITIONAL COUNSELING: ICD-10-CM

## 2023-10-11 PROCEDURE — 97802 MEDICAL NUTRITION INDIV IN: CPT | Mod: VID | Performed by: DIETITIAN, REGISTERED

## 2023-10-11 NOTE — PROGRESS NOTES
"Sherrill Du is a 23 year old who is being evaluated via a billable video visit.      How would you like to obtain your AVS? MyChart  If the video visit is dropped, the invitation should be resent by: Send to e-mail at: [email or web address]  Will anyone else be joining your video visit? No          Medical Weight Loss Initial Diet Evaluation  Assessment:  This patient was referred by Dr. Barker for MNT as treatment for Obesity.       Sherrill is presenting today for a new weight management nutrition consultation. Pt has had an initial appointment with Dr. Barker.    Weight loss medication: Phentermine. Is also on Metformin       Anthropometrics:    Pt's weight is 237.5 lbs   Initial weight: 237.5 lbs   Weight change: 0  BMI: There is no height or weight on file to calculate BMI.   Ideal body weight: 53.5 kg (118 lb 0.9 oz)  Adjusted ideal body weight: 75.2 kg (165 lb 13.3 oz)    Estimated RMR (Garrett-St Jeor equation):  1801 kcals x 1.3 (light active) = 2341 kcals (for weight maintenance)    Recommended Protein Intake: 60-80 grams of protein/day    Medical History:  There is no problem list on file for this patient.     Diabetes: No  HbA1c:  No results found for: \"HGBA1C\"    Nutrition History:   Food allergies/intolerances/cultural or religous food customs: Yes Pineapple, Peaches, Kiwi Fruit-throat itches, mouth tingling     Vitamins/Mineral Supplementation: Prenatal MVI, Vitamin D3,     Dietary Recall:  Breakfast: eggs or protein oatmeal or egg wrap or breakfast sandwich   Lunch: salad with protein or sweet chili chicken bowl (chicken tenders, SF chili sauce, brown rice, vegetables, etc...)   Dinner: meat (turkey burger with a bun) with vegetables (green beans)   Typical Snacks: Protein Shake or Hummus with Pretzels or egg salad with crackers or fruit (strawberries)  Overnight eating: No  Eating out: 1 time/week     Beverages: water, Celsius Drink, Diet Dr Pepper (on occasion)     Exercise: movement " throughout the day at work (at least 10,000 steps/day)    Nutrition Diagnosis (PES statement):     Obesity related to excessive energy intake as evidence by subjective statements and BMI of 41.4 kg/m2.         Nutrition Intervention  Food and/or Nutrient Delivery   Placed emphasis on importance of developing a healthy meal routine, aiming for 3 meals a day and no snacks.      Nutrition Education   Discussed with patient how to build a meal: the importance of including a lean/low fat protein at each meal, include a source of vegetables at a minimum of lunch and dinner and limiting carbohydrate intake to <25% per meal.  Educated on sources of lean protein, portion sizes, the amount of grams found in each source. Recommend patient to aim for 20-30g protein at each meal.  Educated on how to read a food label: keeping total fat <10g and sugar <10g per serving.  Discussed the importance of adequate hydration, with emphasis on drinking 64oz of water or zero calorie beverages per day.    Nutrition Counseling   Encouraged importance of developing routine exercise for health benefits and weight loss.      Goals established by patient:   Continue to focus on protein first at each meal along with plenty of veggies and/or fruit.  Choose whole grains for starches.   Handouts provided:  None    Assessment/Plan:    Pt will follow up in 3 month(s) with bariatrician and 2 month(s) with dietitian.         Video-Visit Details    Type of service:  Video Visit    Video Start Time (time video started): 3:30 pm    Video End Time (time video stopped): 3:47 pm     Originating Location (pt. Location): Home      Distant Location (provider location):  Off-site    Mode of Communication:  Video Conference via [facility]    Physician has received verbal consent for a Video Visit from the patient? Yes      Cherise Lewis, STEVAN

## 2023-10-11 NOTE — LETTER
"    10/11/2023         RE: Sherrill Du  [address] [address]        Dear Colleague,    Thank you for referring your patient, Sherrill Du, to the [facility] BARIATRICS [facility]ARE [place]. Please see a copy of my visit note below.    Sherrill Du is a 23 year old who is being evaluated via a billable video visit.      How would you like to obtain your AVS? MyChart  If the video visit is dropped, the invitation should be resent by: Send to e-mail at: [email or web address]  Will anyone else be joining your video visit? No          Medical Weight Loss Initial Diet Evaluation  Assessment:  This patient was referred by Dr. Barker for MNT as treatment for Obesity.       Sherrill is presenting today for a new weight management nutrition consultation. Pt has had an initial appointment with Dr. Barker.    Weight loss medication: Phentermine. Is also on Metformin       Anthropometrics:    Pt's weight is 237.5 lbs   Initial weight: 237.5 lbs   Weight change: 0  BMI: There is no height or weight on file to calculate BMI.   Ideal body weight: 53.5 kg (118 lb 0.9 oz)  Adjusted ideal body weight: 75.2 kg (165 lb 13.3 oz)    Estimated RMR (Jones-St Jeor equation):  1801 kcals x 1.3 (light active) = 2341 kcals (for weight maintenance)    Recommended Protein Intake: 60-80 grams of protein/day    Medical History:  There is no problem list on file for this patient.     Diabetes: No  HbA1c:  No results found for: \"HGBA1C\"    Nutrition History:   Food allergies/intolerances/cultural or religous food customs: Yes Pineapple, Peaches, Kiwi Fruit-throat itches, mouth tingling     Vitamins/Mineral Supplementation: Prenatal MVI, Vitamin D3,     Dietary Recall:  Breakfast: eggs or protein oatmeal or egg wrap or breakfast sandwich   Lunch: salad with protein or sweet chili chicken bowl (chicken tenders, SF chili sauce, brown rice, vegetables, etc...)   Dinner: meat (turkey burger with a bun) with " vegetables (green beans)   Typical Snacks: Protein Shake or Hummus with Pretzels or egg salad with crackers or fruit (strawberries)  Overnight eating: No  Eating out: 1 time/week     Beverages: water, Celsius Drink, Diet Dr Pepper (on occasion)     Exercise: movement throughout the day at work (at least 10,000 steps/day)    Nutrition Diagnosis (PES statement):     Obesity related to excessive energy intake as evidence by subjective statements and BMI of 41.4 kg/m2.         Nutrition Intervention  Food and/or Nutrient Delivery   Placed emphasis on importance of developing a healthy meal routine, aiming for 3 meals a day and no snacks.      Nutrition Education   Discussed with patient how to build a meal: the importance of including a lean/low fat protein at each meal, include a source of vegetables at a minimum of lunch and dinner and limiting carbohydrate intake to <25% per meal.  Educated on sources of lean protein, portion sizes, the amount of grams found in each source. Recommend patient to aim for 20-30g protein at each meal.  Educated on how to read a food label: keeping total fat <10g and sugar <10g per serving.  Discussed the importance of adequate hydration, with emphasis on drinking 64oz of water or zero calorie beverages per day.    Nutrition Counseling   Encouraged importance of developing routine exercise for health benefits and weight loss.      Goals established by patient:   Continue to focus on protein first at each meal along with plenty of veggies and/or fruit.  Choose whole grains for starches.   Handouts provided:  None    Assessment/Plan:    Pt will follow up in 3 month(s) with bariatrician and 2 month(s) with dietitian.         Video-Visit Details    Type of service:  Video Visit    Video Start Time (time video started): 3:30 pm    Video End Time (time video stopped): 3:47 pm     Originating Location (pt. Location): Home      Distant Location (provider location):  Off-site    Mode of  Communication:  Video Conference via [facility]    Physician has received verbal consent for a Video Visit from the patient? Yes      Cherise Lewis RD        Again, thank you for allowing me to participate in the care of your patient.        Sincerely,        Cherise Lewis RD

## 2023-10-22 ENCOUNTER — HEALTH MAINTENANCE LETTER (OUTPATIENT)
Age: 23
End: 2023-10-22

## 2023-11-14 ENCOUNTER — LAB REQUISITION (OUTPATIENT)
Dept: LAB | Facility: CLINIC | Age: 23
End: 2023-11-14
Payer: COMMERCIAL

## 2023-11-14 DIAGNOSIS — N97.9 FEMALE INFERTILITY, UNSPECIFIED: ICD-10-CM

## 2023-11-14 LAB — HGB BLD-MCNC: 14 G/DL (ref 11.7–15.7)

## 2023-11-14 PROCEDURE — 86850 RBC ANTIBODY SCREEN: CPT | Mod: ORL | Performed by: STUDENT IN AN ORGANIZED HEALTH CARE EDUCATION/TRAINING PROGRAM

## 2023-11-14 PROCEDURE — 82306 VITAMIN D 25 HYDROXY: CPT | Mod: ORL | Performed by: STUDENT IN AN ORGANIZED HEALTH CARE EDUCATION/TRAINING PROGRAM

## 2023-11-14 PROCEDURE — 82670 ASSAY OF TOTAL ESTRADIOL: CPT | Mod: ORL | Performed by: STUDENT IN AN ORGANIZED HEALTH CARE EDUCATION/TRAINING PROGRAM

## 2023-11-14 PROCEDURE — 83002 ASSAY OF GONADOTROPIN (LH): CPT | Mod: ORL | Performed by: STUDENT IN AN ORGANIZED HEALTH CARE EDUCATION/TRAINING PROGRAM

## 2023-11-14 PROCEDURE — 84481 FREE ASSAY (FT-3): CPT | Mod: ORL | Performed by: STUDENT IN AN ORGANIZED HEALTH CARE EDUCATION/TRAINING PROGRAM

## 2023-11-14 PROCEDURE — 86787 VARICELLA-ZOSTER ANTIBODY: CPT | Mod: ORL | Performed by: STUDENT IN AN ORGANIZED HEALTH CARE EDUCATION/TRAINING PROGRAM

## 2023-11-14 PROCEDURE — 87340 HEPATITIS B SURFACE AG IA: CPT | Mod: ORL | Performed by: STUDENT IN AN ORGANIZED HEALTH CARE EDUCATION/TRAINING PROGRAM

## 2023-11-14 PROCEDURE — 83520 IMMUNOASSAY QUANT NOS NONAB: CPT | Mod: ORL | Performed by: STUDENT IN AN ORGANIZED HEALTH CARE EDUCATION/TRAINING PROGRAM

## 2023-11-14 PROCEDURE — 86762 RUBELLA ANTIBODY: CPT | Mod: ORL | Performed by: STUDENT IN AN ORGANIZED HEALTH CARE EDUCATION/TRAINING PROGRAM

## 2023-11-14 PROCEDURE — 86803 HEPATITIS C AB TEST: CPT | Mod: ORL | Performed by: STUDENT IN AN ORGANIZED HEALTH CARE EDUCATION/TRAINING PROGRAM

## 2023-11-14 PROCEDURE — 86901 BLOOD TYPING SEROLOGIC RH(D): CPT | Mod: ORL | Performed by: STUDENT IN AN ORGANIZED HEALTH CARE EDUCATION/TRAINING PROGRAM

## 2023-11-14 PROCEDURE — 85018 HEMOGLOBIN: CPT | Mod: ORL | Performed by: STUDENT IN AN ORGANIZED HEALTH CARE EDUCATION/TRAINING PROGRAM

## 2023-11-14 PROCEDURE — 87389 HIV-1 AG W/HIV-1&-2 AB AG IA: CPT | Mod: ORL | Performed by: STUDENT IN AN ORGANIZED HEALTH CARE EDUCATION/TRAINING PROGRAM

## 2023-11-14 PROCEDURE — 86780 TREPONEMA PALLIDUM: CPT | Mod: ORL | Performed by: STUDENT IN AN ORGANIZED HEALTH CARE EDUCATION/TRAINING PROGRAM

## 2023-11-14 PROCEDURE — 83001 ASSAY OF GONADOTROPIN (FSH): CPT | Mod: ORL | Performed by: STUDENT IN AN ORGANIZED HEALTH CARE EDUCATION/TRAINING PROGRAM

## 2023-11-14 PROCEDURE — 84443 ASSAY THYROID STIM HORMONE: CPT | Mod: ORL | Performed by: STUDENT IN AN ORGANIZED HEALTH CARE EDUCATION/TRAINING PROGRAM

## 2023-11-15 LAB
ABO/RH(D): NORMAL
ANTIBODY SCREEN: NEGATIVE
ESTRADIOL SERPL-MCNC: 32 PG/ML
FSH SERPL IRP2-ACNC: 4.5 MIU/ML
HBV SURFACE AG SERPL QL IA: NONREACTIVE
HCV AB SERPL QL IA: NONREACTIVE
HIV 1+2 AB+HIV1 P24 AG SERPL QL IA: NONREACTIVE
LH SERPL-ACNC: 5.4 MIU/ML
MIS SERPL-MCNC: 9.4 NG/ML (ref 0.89–9.9)
RUBV IGG SERPL QL IA: 3.63 INDEX
RUBV IGG SERPL QL IA: POSITIVE
SPECIMEN EXPIRATION DATE: NORMAL
SPECIMEN EXPIRATION DATE: NORMAL
T PALLIDUM AB SER QL: NONREACTIVE
T3FREE SERPL-MCNC: 3.6 PG/ML (ref 2–4.4)
TSH SERPL DL<=0.005 MIU/L-ACNC: 1.76 UIU/ML (ref 0.3–4.2)
VIT D+METAB SERPL-MCNC: 42 NG/ML (ref 20–50)
VZV IGG SER QL IA: 259.6 INDEX
VZV IGG SER QL IA: POSITIVE

## 2023-12-05 ENCOUNTER — VIRTUAL VISIT (OUTPATIENT)
Dept: SURGERY | Facility: CLINIC | Age: 23
End: 2023-12-05
Payer: COMMERCIAL

## 2023-12-05 DIAGNOSIS — Z71.3 NUTRITIONAL COUNSELING: ICD-10-CM

## 2023-12-05 DIAGNOSIS — E66.01 CLASS 3 SEVERE OBESITY DUE TO EXCESS CALORIES WITHOUT SERIOUS COMORBIDITY WITH BODY MASS INDEX (BMI) OF 40.0 TO 44.9 IN ADULT (H): Primary | ICD-10-CM

## 2023-12-05 DIAGNOSIS — E66.813 CLASS 3 SEVERE OBESITY DUE TO EXCESS CALORIES WITHOUT SERIOUS COMORBIDITY WITH BODY MASS INDEX (BMI) OF 40.0 TO 44.9 IN ADULT (H): Primary | ICD-10-CM

## 2023-12-05 PROCEDURE — 97803 MED NUTRITION INDIV SUBSEQ: CPT | Mod: VID | Performed by: DIETITIAN, REGISTERED

## 2023-12-05 NOTE — PROGRESS NOTES
Sherrill Du is a 23 year old who is being evaluated via a billable video visit.      How would you like to obtain your AVS? MyChart  If the video visit is dropped, the invitation should be resent by: Send to e-mail at: [email or web address]  Will anyone else be joining your video visit? No        Medical  Weight Loss Follow-Up Diet Evaluation  Assessment:  Sherrill is presenting today for a follow up weight management nutrition consultation.  This patient has had an initial appointment and was referred by Dr. Barker for MNT as treatment for Obesity.     Weight loss medication: Phentermine. Metformin  Pt's weight is 222.2 lbs  Initial weight: 237.5 lbs   Weight change: 15.3 lbs (down x 2 months)         No data to display              BMI: There is no height or weight on file to calculate BMI.  Ideal body weight: 53.5 kg (118 lb 0.9 oz)  Adjusted ideal body weight: 75.2 kg (165 lb 13.3 oz)    Estimated RMR (McDuffie-St Jeor equation):   1732 kcals x 1.3 (light active) = 2252 kcals (for weight maintenance)     Recommended Protein Intake: 60-80 grams of protein/day  Patient Active Problem List:  There is no problem list on file for this patient.  Diabetes: No    Progress on goals from last visit: Did notice that when she started the medication, her appetite was super suppressed, noting that it has gotten better.  Patient reports that she has been trying to focus on protein first.      Dietary Recall:  Breakfast: eggs or breakfast sandwich  Lunch:whatever is available-fruit or vegetable, chicken from a wrap sandwich or Lean Cuisine  Dinner:chicken and potato soup or chili or chicken tenders or chicken bobby, veggies  Typical snacks: typically nothing  Beverages: water (at least 64 + oz/day)  Exercise: movement throughout the day at work (striving for at least 10,000 steps/day), walking outside weather pending    Nutrition Diagnosis:  Overweight/Obesity (NC 3.3) related to overeating and poor lifestyle habits as  evidenced by patient's subjective statements and BMI 38.4 kg/m2.   Intervention:  Food and/or nutrient delivery: balanced meals, adequate protein   Nutrition education: none  Nutrition counseling: provided support and encouragement      Monitoring/Evaluation:    Goals:  Continue to focus on protein first at each meal, aiming for 20-30 grams of protein/meal, 3 meals/day.   Work on incorporating fiber into meals.     Patient to follow up in 1 month(s) with bariatrician and 2 month(s) with RD      Video-Visit Details    Type of service:  Video Visit    Video Start Time (time video started): 3:21 pm    Video End Time (time video stopped): 3:37 pm    Originating Location (pt. Location): Home      Distant Location (provider location):  Off-site    Mode of Communication:  Video Conference via [facility]    Physician has received verbal consent for a Video Visit from the patient? Yes      Cherise Lewis RD

## 2023-12-05 NOTE — LETTER
12/5/2023         RE: Sherrill Du  [address] [address]        Dear Colleague,    Thank you for referring your patient, Sherrill Du, to the [facility] [place]. Please see a copy of my visit note below.    Sherrill Du is a 23 year old who is being evaluated via a billable video visit.      How would you like to obtain your AVS? MyChart  If the video visit is dropped, the invitation should be resent by: Send to e-mail at: [email or web address]  Will anyone else be joining your video visit? No        Medical  Weight Loss Follow-Up Diet Evaluation  Assessment:  Sherrill is presenting today for a follow up weight management nutrition consultation.  This patient has had an initial appointment and was referred by Dr. Barker for MNT as treatment for Obesity.     Weight loss medication: Phentermine. Metformin  Pt's weight is 222.2 lbs  Initial weight: 237.5 lbs   Weight change: 15.3 lbs (down x 2 months)         No data to display              BMI: There is no height or weight on file to calculate BMI.  Ideal body weight: 53.5 kg (118 lb 0.9 oz)  Adjusted ideal body weight: 75.2 kg (165 lb 13.3 oz)    Estimated RMR ([place]-St Jeor equation):   1732 kcals x 1.3 (light active) = 2252 kcals (for weight maintenance)     Recommended Protein Intake: 60-80 grams of protein/day  Patient Active Problem List:  There is no problem list on file for this patient.  Diabetes: No    Progress on goals from last visit: Did notice that when she started the medication, her appetite was super suppressed, noting that it has gotten better.  Patient reports that she has been trying to focus on protein first.      Dietary Recall:  Breakfast: eggs or breakfast sandwich  Lunch:whatever is available-fruit or vegetable, chicken from a wrap sandwich or Lean Cuisine  Dinner:chicken and potato soup or chili or chicken tenders or chicken bobby, veggies  Typical snacks: typically  nothing  Beverages: water (at least 64 + oz/day)  Exercise: movement throughout the day at work (striving for at least 10,000 steps/day), walking outside weather pending    Nutrition Diagnosis:  Overweight/Obesity (NC 3.3) related to overeating and poor lifestyle habits as evidenced by patient's subjective statements and BMI 38.4 kg/m2.   Intervention:  Food and/or nutrient delivery: balanced meals, adequate protein   Nutrition education: none  Nutrition counseling: provided support and encouragement      Monitoring/Evaluation:    Goals:  Continue to focus on protein first at each meal, aiming for 20-30 grams of protein/meal, 3 meals/day.   Work on incorporating fiber into meals.     Patient to follow up in 1 month(s) with bariatrician and 2 month(s) with RD      Video-Visit Details    Type of service:  Video Visit    Video Start Time (time video started): 3:21 pm    Video End Time (time video stopped): 3:37 pm    Originating Location (pt. Location): Home      Distant Location (provider location):  Off-site    Mode of Communication:  Video Conference via [facility]    Physician has received verbal consent for a Video Visit from the patient? Yes      Cherise Lewis RD          Again, thank you for allowing me to participate in the care of your patient.        Sincerely,        Cherise Lewis RD

## 2023-12-20 ENCOUNTER — LAB REQUISITION (OUTPATIENT)
Dept: LAB | Facility: CLINIC | Age: 23
End: 2023-12-20
Payer: COMMERCIAL

## 2023-12-20 DIAGNOSIS — N97.9 FEMALE INFERTILITY, UNSPECIFIED: ICD-10-CM

## 2023-12-20 PROCEDURE — 84702 CHORIONIC GONADOTROPIN TEST: CPT | Mod: ORL | Performed by: STUDENT IN AN ORGANIZED HEALTH CARE EDUCATION/TRAINING PROGRAM

## 2023-12-21 LAB — HCG INTACT+B SERPL-ACNC: 405 MIU/ML

## 2023-12-26 ENCOUNTER — LAB REQUISITION (OUTPATIENT)
Dept: LAB | Facility: CLINIC | Age: 23
End: 2023-12-26
Payer: COMMERCIAL

## 2023-12-26 DIAGNOSIS — N97.9 FEMALE INFERTILITY, UNSPECIFIED: ICD-10-CM

## 2023-12-26 PROCEDURE — 84702 CHORIONIC GONADOTROPIN TEST: CPT | Mod: ORL | Performed by: STUDENT IN AN ORGANIZED HEALTH CARE EDUCATION/TRAINING PROGRAM

## 2023-12-27 LAB — HCG INTACT+B SERPL-ACNC: 4335 MIU/ML

## 2024-01-19 ENCOUNTER — LAB REQUISITION (OUTPATIENT)
Dept: LAB | Facility: CLINIC | Age: 24
End: 2024-01-19

## 2024-01-19 DIAGNOSIS — Z36.89 ENCOUNTER FOR OTHER SPECIFIED ANTENATAL SCREENING: ICD-10-CM

## 2024-01-19 PROCEDURE — 87086 URINE CULTURE/COLONY COUNT: CPT | Mod: ORL | Performed by: NURSE PRACTITIONER

## 2024-01-19 PROCEDURE — 87086 URINE CULTURE/COLONY COUNT: CPT | Performed by: NURSE PRACTITIONER

## 2024-01-21 LAB — BACTERIA UR CULT: NO GROWTH

## 2024-02-07 ENCOUNTER — LAB REQUISITION (OUTPATIENT)
Dept: LAB | Facility: CLINIC | Age: 24
End: 2024-02-07
Payer: COMMERCIAL

## 2024-02-07 DIAGNOSIS — Z13.1 ENCOUNTER FOR SCREENING FOR DIABETES MELLITUS: ICD-10-CM

## 2024-02-07 DIAGNOSIS — Z11.3 ENCOUNTER FOR SCREENING FOR INFECTIONS WITH A PREDOMINANTLY SEXUAL MODE OF TRANSMISSION: ICD-10-CM

## 2024-02-07 DIAGNOSIS — Z34.90 ENCOUNTER FOR SUPERVISION OF NORMAL PREGNANCY, UNSPECIFIED, UNSPECIFIED TRIMESTER: ICD-10-CM

## 2024-02-07 PROCEDURE — 86900 BLOOD TYPING SEROLOGIC ABO: CPT | Mod: ORL | Performed by: STUDENT IN AN ORGANIZED HEALTH CARE EDUCATION/TRAINING PROGRAM

## 2024-02-07 PROCEDURE — 87340 HEPATITIS B SURFACE AG IA: CPT | Mod: ORL | Performed by: STUDENT IN AN ORGANIZED HEALTH CARE EDUCATION/TRAINING PROGRAM

## 2024-02-07 PROCEDURE — 86592 SYPHILIS TEST NON-TREP QUAL: CPT | Mod: ORL | Performed by: STUDENT IN AN ORGANIZED HEALTH CARE EDUCATION/TRAINING PROGRAM

## 2024-02-07 PROCEDURE — 85025 COMPLETE CBC W/AUTO DIFF WBC: CPT | Mod: ORL | Performed by: STUDENT IN AN ORGANIZED HEALTH CARE EDUCATION/TRAINING PROGRAM

## 2024-02-07 PROCEDURE — 87491 CHLMYD TRACH DNA AMP PROBE: CPT | Mod: ORL | Performed by: STUDENT IN AN ORGANIZED HEALTH CARE EDUCATION/TRAINING PROGRAM

## 2024-02-07 PROCEDURE — 87389 HIV-1 AG W/HIV-1&-2 AB AG IA: CPT | Mod: ORL | Performed by: STUDENT IN AN ORGANIZED HEALTH CARE EDUCATION/TRAINING PROGRAM

## 2024-02-07 PROCEDURE — 86762 RUBELLA ANTIBODY: CPT | Mod: ORL | Performed by: STUDENT IN AN ORGANIZED HEALTH CARE EDUCATION/TRAINING PROGRAM

## 2024-02-07 PROCEDURE — 83036 HEMOGLOBIN GLYCOSYLATED A1C: CPT | Mod: ORL | Performed by: STUDENT IN AN ORGANIZED HEALTH CARE EDUCATION/TRAINING PROGRAM

## 2024-02-07 PROCEDURE — 86803 HEPATITIS C AB TEST: CPT | Mod: ORL | Performed by: STUDENT IN AN ORGANIZED HEALTH CARE EDUCATION/TRAINING PROGRAM

## 2024-02-08 LAB
ABO/RH(D): NORMAL
ANTIBODY SCREEN: NEGATIVE
BASOPHILS # BLD AUTO: 0 10E3/UL (ref 0–0.2)
BASOPHILS NFR BLD AUTO: 0 %
C TRACH DNA SPEC QL PROBE+SIG AMP: NEGATIVE
EOSINOPHIL # BLD AUTO: 0.2 10E3/UL (ref 0–0.7)
EOSINOPHIL NFR BLD AUTO: 3 %
ERYTHROCYTE [DISTWIDTH] IN BLOOD BY AUTOMATED COUNT: 12.4 % (ref 10–15)
HBA1C MFR BLD: 5.2 %
HBV SURFACE AG SERPL QL IA: NONREACTIVE
HCT VFR BLD AUTO: 36.7 % (ref 35–47)
HCV AB SERPL QL IA: NONREACTIVE
HGB BLD-MCNC: 12.7 G/DL (ref 11.7–15.7)
HIV 1+2 AB+HIV1 P24 AG SERPL QL IA: NONREACTIVE
IMM GRANULOCYTES # BLD: 0 10E3/UL
IMM GRANULOCYTES NFR BLD: 0 %
LYMPHOCYTES # BLD AUTO: 1.7 10E3/UL (ref 0.8–5.3)
LYMPHOCYTES NFR BLD AUTO: 32 %
MCH RBC QN AUTO: 30.1 PG (ref 26.5–33)
MCHC RBC AUTO-ENTMCNC: 34.6 G/DL (ref 31.5–36.5)
MCV RBC AUTO: 87 FL (ref 78–100)
MONOCYTES # BLD AUTO: 0.5 10E3/UL (ref 0–1.3)
MONOCYTES NFR BLD AUTO: 10 %
N GONORRHOEA DNA SPEC QL NAA+PROBE: NEGATIVE
NEUTROPHILS # BLD AUTO: 2.9 10E3/UL (ref 1.6–8.3)
NEUTROPHILS NFR BLD AUTO: 55 %
NRBC # BLD AUTO: 0 10E3/UL
NRBC BLD AUTO-RTO: 0 /100
PLATELET # BLD AUTO: 220 10E3/UL (ref 150–450)
RBC # BLD AUTO: 4.22 10E6/UL (ref 3.8–5.2)
RPR SER QL: NONREACTIVE
RUBV IGG SERPL QL IA: 2.44 INDEX
RUBV IGG SERPL QL IA: POSITIVE
SPECIMEN EXPIRATION DATE: NORMAL
WBC # BLD AUTO: 5.4 10E3/UL (ref 4–11)

## 2024-04-18 ENCOUNTER — LAB REQUISITION (OUTPATIENT)
Dept: LAB | Facility: CLINIC | Age: 24
End: 2024-04-18
Payer: COMMERCIAL

## 2024-04-18 DIAGNOSIS — R52 PAIN, UNSPECIFIED: ICD-10-CM

## 2024-04-18 PROCEDURE — 87086 URINE CULTURE/COLONY COUNT: CPT | Mod: ORL | Performed by: STUDENT IN AN ORGANIZED HEALTH CARE EDUCATION/TRAINING PROGRAM

## 2024-04-20 LAB — BACTERIA UR CULT: NORMAL

## 2024-06-03 ENCOUNTER — LAB REQUISITION (OUTPATIENT)
Dept: LAB | Facility: CLINIC | Age: 24
End: 2024-06-03
Payer: COMMERCIAL

## 2024-06-03 DIAGNOSIS — Z11.3 ENCOUNTER FOR SCREENING FOR INFECTIONS WITH A PREDOMINANTLY SEXUAL MODE OF TRANSMISSION: ICD-10-CM

## 2024-06-03 DIAGNOSIS — Z36.89 ENCOUNTER FOR OTHER SPECIFIED ANTENATAL SCREENING: ICD-10-CM

## 2024-06-03 LAB
ERYTHROCYTE [DISTWIDTH] IN BLOOD BY AUTOMATED COUNT: 13.1 % (ref 10–15)
HCT VFR BLD AUTO: 38.5 % (ref 35–47)
HGB BLD-MCNC: 13 G/DL (ref 11.7–15.7)
MCH RBC QN AUTO: 30.7 PG (ref 26.5–33)
MCHC RBC AUTO-ENTMCNC: 33.8 G/DL (ref 31.5–36.5)
MCV RBC AUTO: 91 FL (ref 78–100)
PLATELET # BLD AUTO: 205 10E3/UL (ref 150–450)
RBC # BLD AUTO: 4.23 10E6/UL (ref 3.8–5.2)
WBC # BLD AUTO: 8.9 10E3/UL (ref 4–11)

## 2024-06-03 PROCEDURE — 86780 TREPONEMA PALLIDUM: CPT | Mod: ORL | Performed by: NURSE PRACTITIONER

## 2024-06-03 PROCEDURE — 85027 COMPLETE CBC AUTOMATED: CPT | Mod: ORL | Performed by: NURSE PRACTITIONER

## 2024-06-04 LAB — T PALLIDUM AB SER QL: NONREACTIVE

## 2024-06-22 ENCOUNTER — HOSPITAL ENCOUNTER (OUTPATIENT)
Facility: HOSPITAL | Age: 24
End: 2024-06-22
Payer: COMMERCIAL

## 2024-07-31 ENCOUNTER — LAB REQUISITION (OUTPATIENT)
Dept: LAB | Facility: CLINIC | Age: 24
End: 2024-07-31
Payer: COMMERCIAL

## 2024-07-31 DIAGNOSIS — Z3A.36 36 WEEKS GESTATION OF PREGNANCY: ICD-10-CM

## 2024-07-31 PROCEDURE — 87653 STREP B DNA AMP PROBE: CPT | Mod: ORL | Performed by: STUDENT IN AN ORGANIZED HEALTH CARE EDUCATION/TRAINING PROGRAM

## 2024-08-01 LAB — GP B STREP DNA SPEC QL NAA+PROBE: NEGATIVE

## 2024-08-20 PROBLEM — Z34.90 ENCOUNTER FOR ELECTIVE INDUCTION OF LABOR: Status: ACTIVE | Noted: 2024-08-20

## 2024-08-21 ENCOUNTER — ANESTHESIA EVENT (OUTPATIENT)
Dept: OBGYN | Facility: HOSPITAL | Age: 24
End: 2024-08-21
Payer: COMMERCIAL

## 2024-08-21 ENCOUNTER — ANESTHESIA (OUTPATIENT)
Dept: OBGYN | Facility: HOSPITAL | Age: 24
End: 2024-08-21
Payer: COMMERCIAL

## 2024-08-21 PROCEDURE — 59510 CESAREAN DELIVERY: CPT | Performed by: ANESTHESIOLOGY

## 2024-08-21 PROCEDURE — 250N000011 HC RX IP 250 OP 636: Performed by: OBSTETRICS & GYNECOLOGY

## 2024-08-21 PROCEDURE — 250N000009 HC RX 250: Performed by: STUDENT IN AN ORGANIZED HEALTH CARE EDUCATION/TRAINING PROGRAM

## 2024-08-21 PROCEDURE — 258N000003 HC RX IP 258 OP 636

## 2024-08-21 PROCEDURE — 250N000009 HC RX 250: Performed by: ANESTHESIOLOGY

## 2024-08-21 PROCEDURE — 258N000003 HC RX IP 258 OP 636: Performed by: OBSTETRICS & GYNECOLOGY

## 2024-08-21 PROCEDURE — 250N000011 HC RX IP 250 OP 636

## 2024-08-21 RX ORDER — LIDOCAINE HCL/EPINEPHRINE/PF 2%-1:200K
VIAL (ML) INJECTION PRN
Status: DISCONTINUED | OUTPATIENT
Start: 2024-08-21 | End: 2024-08-22

## 2024-08-21 RX ORDER — DEXAMETHASONE SODIUM PHOSPHATE 10 MG/ML
INJECTION, SOLUTION INTRAMUSCULAR; INTRAVENOUS PRN
Status: DISCONTINUED | OUTPATIENT
Start: 2024-08-21 | End: 2024-08-22

## 2024-08-21 RX ORDER — LIDOCAINE HYDROCHLORIDE AND EPINEPHRINE 15; 5 MG/ML; UG/ML
INJECTION, SOLUTION EPIDURAL PRN
Status: DISCONTINUED | OUTPATIENT
Start: 2024-08-21 | End: 2024-08-22

## 2024-08-21 RX ORDER — ONDANSETRON 2 MG/ML
INJECTION INTRAMUSCULAR; INTRAVENOUS PRN
Status: DISCONTINUED | OUTPATIENT
Start: 2024-08-21 | End: 2024-08-22

## 2024-08-21 RX ORDER — MORPHINE SULFATE 1 MG/ML
INJECTION, SOLUTION EPIDURAL; INTRATHECAL; INTRAVENOUS PRN
Status: DISCONTINUED | OUTPATIENT
Start: 2024-08-21 | End: 2024-08-22

## 2024-08-21 RX ADMIN — ONDANSETRON 4 MG: 2 INJECTION INTRAMUSCULAR; INTRAVENOUS at 22:03

## 2024-08-21 RX ADMIN — Medication 300 ML/HR: at 22:20

## 2024-08-21 RX ADMIN — AZITHROMYCIN 500 MG: 500 INJECTION, POWDER, LYOPHILIZED, FOR SOLUTION INTRAVENOUS at 21:59

## 2024-08-21 RX ADMIN — LIDOCAINE HYDROCHLORIDE,EPINEPHRINE BITARTRATE 5 ML: 20; .005 INJECTION, SOLUTION EPIDURAL; INFILTRATION; INTRACAUDAL; PERINEURAL at 21:40

## 2024-08-21 RX ADMIN — LIDOCAINE HYDROCHLORIDE,EPINEPHRINE BITARTRATE 3 ML: 15; .005 INJECTION, SOLUTION EPIDURAL; INFILTRATION; INTRACAUDAL; PERINEURAL at 11:36

## 2024-08-21 RX ADMIN — LIDOCAINE HYDROCHLORIDE,EPINEPHRINE BITARTRATE 2 ML: 15; .005 INJECTION, SOLUTION EPIDURAL; INFILTRATION; INTRACAUDAL; PERINEURAL at 11:37

## 2024-08-21 RX ADMIN — PHENYLEPHRINE HYDROCHLORIDE 100 MCG: 10 INJECTION INTRAVENOUS at 22:05

## 2024-08-21 RX ADMIN — PHENYLEPHRINE HYDROCHLORIDE 0.4 MCG/MIN: 10 INJECTION INTRAVENOUS at 22:05

## 2024-08-21 RX ADMIN — Medication 2 MG: at 22:30

## 2024-08-21 RX ADMIN — LIDOCAINE HYDROCHLORIDE,EPINEPHRINE BITARTRATE 15 ML: 20; .005 INJECTION, SOLUTION EPIDURAL; INFILTRATION; INTRACAUDAL; PERINEURAL at 21:34

## 2024-08-21 RX ADMIN — Medication 3 G: at 21:59

## 2024-08-21 RX ADMIN — DEXAMETHASONE SODIUM PHOSPHATE 10 MG: 10 INJECTION, SOLUTION INTRAMUSCULAR; INTRAVENOUS at 22:03

## 2024-08-21 NOTE — ANESTHESIA PROCEDURE NOTES
Epidural catheter Procedure Note    Pre-Procedure   Staff -        Anesthesiologist:  Markus Lindsey MD       Performed By: anesthesiologist       Location: OB       Procedure Start/Stop Times: 8/21/2024 11:25 AM and 8/21/2024 11:37 AM       Pre-Anesthestic Checklist: patient identified, IV checked, risks and benefits discussed, informed consent, monitors and equipment checked, pre-op evaluation, at physician/surgeon's request and post-op pain management  Timeout:       Correct Patient: Yes        Correct Procedure: Yes        Correct Site: Yes        Correct Position: Yes   Procedure Documentation  Procedure: epidural catheter       Patient Position: sitting       Patient Prep/Sterile Barriers: sterile gloves, mask       Skin prep: Chloraprep       Local skin infiltrated with 3 mL of 1% lidocaine.        Insertion Site: L3-4. (midline approach).       Technique: LORT air        GILL at 9 cm.       Needle type: "[employer].       Needle Gauge: 18.        Needle Length (Inches): 3.5        Catheter: 20 G.          Catheter threaded easily.         6 cm epidural space.         Threaded 15 cm at skin.         # of attempts: 2 and  # of redirects:  0    Assessment/Narrative         Paresthesias: No.       Test dose of 3 mL lidocaine 1.5% w/ 1:200,000 epinephrine at.         Test dose negative, 3 minutes after injection, for signs of intravascular, subdural, or intrathecal injection.       Insertion/Infusion Method: LORT air       No aspiration negative for Heme or CSF via Epidural Catheter.    Medication(s) Administered   Medication Administration Time: 8/21/2024 11:25 AM     Comments:  Single pass epidural at L3-L4. Crisp loss of resistance at 9 cm. Catheter threaded easily 6 cm into the epidural space. Negative aspiration for heme and CSF. Negative test dose. No signs of LAST. No pain or paresthesias upon placement. No complications.    Prior to leaving room, patient reported much improved pain control.        FOR  "[facility] ONLY:   Pain Team Contact information: please page the Pain Team Via [employer]. Search \"Pain\". During daytime hours, please page the attending first. At night please page the resident first.      "

## 2024-08-21 NOTE — ANESTHESIA PREPROCEDURE EVALUATION
Anesthesia Pre-Procedure Evaluation    Patient: Sherrill Townsend   MRN: [id number] : 2000        Procedure :   Induction       Past Medical History:   Diagnosis Date     Asthma     currently uses inhaler     Dislocated elbow 2008    L elbow      Past Surgical History:   Procedure Laterality Date     FINGER SURGERY Right 2010    R pointer finger     HERNIA REPAIR, UMBILICAL  2004     OTHER SURGICAL HISTORY  2018    wisdom teeth removal      Allergies   Allergen Reactions     Pineapple Itching and Swelling     Swollen tongue, itchy throat     Bermuda Grass Extract Hives      Social History     Tobacco Use     Smoking status: Former     Passive exposure: Current     Smokeless tobacco: Never     Tobacco comments:     passive smoke exposure   Substance Use Topics     Alcohol use: Not Currently      Wt Readings from Last 1 Encounters:   24 121.4 kg (267 lb 10.2 oz)        Anesthesia Evaluation   Pt has had prior anesthetic.     No history of anesthetic complications       ROS/MED HX  ENT/Pulmonary:     (+)                      asthma                  Neurologic:  - neg neurologic ROS     Cardiovascular:  - neg cardiovascular ROS     METS/Exercise Tolerance:     Hematologic:  - neg hematologic  ROS     Musculoskeletal:       GI/Hepatic:       Renal/Genitourinary:       Endo:     (+)               Obesity,       Psychiatric/Substance Use:  - neg psychiatric ROS     Infectious Disease:       Malignancy:       Other:            Physical Exam    Airway        Mallampati: III   TM distance: > 3 FB   Neck ROM: full   Mouth opening: > 3 cm    Respiratory Devices and Support         Dental  no notable dental history         Cardiovascular   cardiovascular exam normal          Pulmonary   pulmonary exam normal              OUTSIDE LABS:  CBC:   Lab Results   Component Value Date    WBC 9.4 2024    HGB 13.4 2024    HCT 38.5 2024     2024     BMP: No results  "found for: \"NA\", \"POTASSIUM\", \"CHLORIDE\", \"CO2\", \"BUN\", \"CR\", \"GLC\"  COAGS: No results found for: \"PTT\", \"INR\", \"FIBR\"  POC:   Lab Results   Component Value Date    HCG Negative 01/14/2019     HEPATIC: No results found for: \"ALBUMIN\", \"PROTTOTAL\", \"ALT\", \"AST\", \"GGT\", \"ALKPHOS\", \"BILITOTAL\", \"BILIDIRECT\", \"MODESTO\"  OTHER:   Lab Results   Component Value Date    TSH 1.40 01/14/2019       Anesthesia Plan    ASA Status:  3, emergent    NPO Status:  NPO Appropriate    Anesthesia Type: Epidural.              Consents    Anesthesia Plan(s) and associated risks, benefits, and realistic alternatives discussed. Questions answered and patient/representative(s) expressed understanding.     - Discussed: Risks, Benefits and Alternatives for BOTH SEDATION and the PROCEDURE were discussed     - Discussed with:  Patient            Postoperative Care            Comments:    Other Comments: Patient requests labor epidural.  Chart reviewed, including labs.  I reviewed the options and risks with the patient, including but not limited to: bleeding, infection, damage to tissues under the skin (nerves, muscles, blood vessels), hypotension, headache, and epidural failure.  The patient's questions were answered and the consent was signed. The patient agrees to elective epidural placement.    Plan for epidural conversion to surgical anesthesia with epidural duramorph (2 mg).  Discussed potential need to convert GA.  Discussed potential need for blood transfusion.               neg OB ROS.      Markus Lindsey MD    I have reviewed the pertinent notes and labs in the chart from the past 30 days and (re)examined the patient.  Any updates or changes from those notes are reflected in this note.             "

## 2024-08-22 NOTE — ANESTHESIA CARE TRANSFER NOTE
Patient: Sherrill Townsend    Procedure: Procedure(s):   SECTION  Induction    Diagnosis: Failure to progress in labor [O62.2]  Diagnosis Additional Information: No value filed.    Anesthesia Type:   Epidural     Note:    Oropharynx: oropharynx clear of all foreign objects  Level of Consciousness: awake  Oxygen Supplementation: room air    Independent Airway: airway patency satisfactory and stable  Dentition: dentition unchanged  Vital Signs Stable: post-procedure vital signs reviewed and stable  Report to RN Given: handoff report given  Patient transferred to: Labor and Delivery    Handoff Report: Identifed the Patient, Identified the Reponsible Provider, Reviewed the pertinent medical history, Discussed the surgical course, Reviewed Intra-OP anesthesia mangement and issues during anesthesia, Set expectations for post-procedure period and Allowed opportunity for questions and acknowledgement of understanding      Vitals:  Vitals Value Taken Time   /79 24 0133   Temp 36.7  C (98  F) 24   Pulse 99 24   Resp 16 24   SpO2 97 % 24       Electronically Signed By: MOLLY Stone CRNA  2024  1:59 AM

## 2024-08-22 NOTE — ANESTHESIA POSTPROCEDURE EVALUATION
Patient: Shrerill Townsend    Procedure: Procedure(s):   SECTION  Induction    Anesthesia Type:  Epidural    Note:  Disposition: Inpatient   Postop Pain Control: Uneventful            Sign Out: Well controlled pain   PONV: No   Neuro/Psych: Uneventful            Sign Out: Acceptable/Baseline neuro status   Airway/Respiratory: Uneventful            Sign Out: Acceptable/Baseline resp. status   CV/Hemodynamics: Uneventful            Sign Out: Acceptable CV status; No obvious hypovolemia; No obvious fluid overload   Other NRE: NONE   DID A NON-ROUTINE EVENT OCCUR?     Event details/Postop Comments:  Good analgesia with labor epidural.  Epidural worked well for .  No problems.  No follow up necessary.       Epidural-to- Updated ASA: 3 Emergent    Last vitals:  Vitals Value Taken Time   /68 24 1145   Temp 36.6  C (97.8  F) 24 1145   Pulse 95 24 1145   Resp 18 24 1145   SpO2 98 % 24 1145       Electronically Signed By: Natali Watson MD  2024  1:11 PM

## 2024-09-16 RX ORDER — CYCLOBENZAPRINE HCL 5 MG
1 TABLET ORAL
COMMUNITY
Start: 2024-03-12 | End: 2024-09-17

## 2024-09-16 RX ORDER — BUTALBITAL, ACETAMINOPHEN AND CAFFEINE 50; 325; 40 MG/1; MG/1; MG/1
TABLET ORAL
COMMUNITY
Start: 2024-04-08 | End: 2024-09-17

## 2024-09-16 RX ORDER — PROMETHAZINE HYDROCHLORIDE 25 MG/1
25 TABLET ORAL EVERY 4 HOURS PRN
COMMUNITY
Start: 2024-01-19 | End: 2024-09-17

## 2024-09-17 ENCOUNTER — OFFICE VISIT (OUTPATIENT)
Dept: MIDWIFE SERVICES | Facility: CLINIC | Age: 24
End: 2024-09-17
Payer: COMMERCIAL

## 2024-09-17 VITALS — SYSTOLIC BLOOD PRESSURE: 116 MMHG | DIASTOLIC BLOOD PRESSURE: 68 MMHG

## 2024-09-17 DIAGNOSIS — O92.29 POSTPARTUM NIPPLE PAIN: Primary | ICD-10-CM

## 2024-09-17 DIAGNOSIS — N64.3 HYPERLACTATION: ICD-10-CM

## 2024-09-17 PROBLEM — E28.2 POLYCYSTIC OVARY SYNDROME: Status: ACTIVE | Noted: 2022-09-21

## 2024-09-17 PROBLEM — N97.9 FEMALE INFERTILITY: Status: ACTIVE | Noted: 2023-11-13

## 2024-09-17 PROCEDURE — G2211 COMPLEX E/M VISIT ADD ON: HCPCS | Performed by: ADVANCED PRACTICE MIDWIFE

## 2024-09-17 PROCEDURE — 99205 OFFICE O/P NEW HI 60 MIN: CPT | Performed by: ADVANCED PRACTICE MIDWIFE

## 2024-09-17 ASSESSMENT — EDINBURGH POSTNATAL DEPRESSION SCALE (EPDS)
I HAVE BEEN SO UNHAPPY THAT I HAVE HAD DIFFICULTY SLEEPING: NOT AT ALL
THINGS HAVE BEEN GETTING ON TOP OF ME: NO, I HAVE BEEN COPING AS WELL AS EVER
I HAVE FELT SAD OR MISERABLE: NO, NOT AT ALL
THE THOUGHT OF HARMING MYSELF HAS OCCURRED TO ME: NEVER
I HAVE BEEN ANXIOUS OR WORRIED FOR NO GOOD REASON: HARDLY EVER
I HAVE BEEN SO UNHAPPY THAT I HAVE BEEN CRYING: ONLY OCCASIONALLY
I HAVE FELT SCARED OR PANICKY FOR NO GOOD REASON: NO, NOT AT ALL
I HAVE BLAMED MYSELF UNNECESSARILY WHEN THINGS WENT WRONG: NOT VERY OFTEN
I HAVE LOOKED FORWARD WITH ENJOYMENT TO THINGS: AS MUCH AS I EVER DID
I HAVE BEEN ABLE TO LAUGH AND SEE THE FUNNY SIDE OF THINGS: NOT QUITE SO MUCH NOW
TOTAL SCORE: 4

## 2024-09-17 NOTE — PATIENT INSTRUCTIONS
"  Use good positioning for deep latch, with baby held close to body and baby's head/shoulders/hips in good alignment.  When in a seated position, use a pillow to help bring baby close to breasts, and stepstool to elevate your knees above hips.    When bringing your baby to your breast, compress your breast vertically and in line with baby's mouth--this will help them to get a larger mouthful of breast and a deeper latch. Babies latch best to the breast by bringing their chin in first, so point your nipple towards baby's nose, tuck the chin in close, and then wait for his mouth to open.  When his mouth opens, bring his head in deeply.  Baby's chin should be snugged deeply in your breast, their upper cheeks should be touching the breast, and their nose just out of the breast.   If you have concerns about baby's nose getting buried in your breast, just move the hand you have behind his head/neck down a little bit--so it is more on his head and shoulders.  This will allow his head to tilt back so that he can nurse comfortably and his nose will come out of  your breast.     If there is pinching or pain, try using a finger to give a little gentle pressure on his chin to help him open more widely and take in more of your breast.  If it is still painful, use a finger to break the suction, remove baby from the breast and try again until there is no pain with nursing.  There is sometimes a little pain when the baby first begins sucking, but after the first few seconds there should be no pain--only a tugging feeling.   You can use a rolled burp cloth or blanket under your breast for support, so that you do not need to hold your breast up for the whole feeding.  Another idea is using a scarf or long piece of cloth around your neck and beneath your breast to make a sort of \"breast sling.\"   Continue to nurse baby on cue, 8-12 times each day.  Once your baby has returned to their birthweight, you can trust them to awaken when " "they need to eat--you do not need to awaken them on a schedule.  When you nurse, feed on one side until baby finishes swallowing.  Once swallowing slows, use breast compression to encourage more swallowing, but once there is no more active swallowing, and baby is either sleeping, coming off the breast, or just \"nibbling,\" it is OK to use a finger to take baby off the breast and move to the other breast.  Do the same on the other side.  Offer both breasts at each feeding.  It is more important to watch the baby than the clock!  If he takes one side you do not need to pump the other.  Keven needs about 25 oz of milk each day to grow well.  If he nurses at home as he did in the office today, about 10 times/day, he is getting plenty of milk and does not need extra in bottles.  You can certainly give some bottles if you choose to--2.5 - 3 oz is a good amount to give.  Continue to use the paced feeding method.  You are currently making much more milk than Keven needs, so you can decrease your pumping.  Choose the two pumping sessions that you most want to drop, and instead of pumping until 10 oz come each time, tomorrow stop after 9 oz.  Then the next day drop back by another ounce, and then another, until you can comfortably drop those pumping sessions entirely, one by one.  Then you can just pump once daily to have the milk you need for Keven's bottles for that day.  Continue using the All-Purpose Nipple Ointment until your nipples are comfortable, and then stop.  Also consider stopping using the silver nipple caps, because they can be too tight on your breasts and cause nipple swelling.  Consider connecting with a therapist to support you during the postpartum time, and for help with processing your unexpected .  Adding a supplement of fish oil, 1000 mg/day, can also be helpful.  Aim for a supplement that contains more EPA than DHA fish oil;  You might want to consider one that says \"no fish burps\" or " something similar!   Follow up with lactation as needed, and pediatric provider as planned.  [employer] can be used for brief questions, but it's important to know that messages are not seen Friday through Sunday. If urgent help is needed, Monday through Friday you can call [phone] and one of our lactation consultants will get the message and respond; if you need a rapid response over a weekend or holiday, it is best to call your on-call maternity or pediatric provider.  Please feel free to schedule a return visit if the concern is more detailed;  telephone visits are also an option if you don't feel you need to be seen in person.   _______________    Postpartum Emotional Support/Mental Health Resources:    Postpartum Support [place]:  Https://www.[facility].org/get_help   Helpline:  233.979.6068;  Email:  [email or web address]    [facility] Mother-Baby Program   [employer]:  848-826-LQLK:  Leave message, will call back within 2 days   Availability of multiple types of therapy programs    [facility]:  Postpartum mental health services offered free of charge to Medicaid clients.  Offering video visits, and some in-office or in-home visits.   Phone:  890.252.1246   Email:  info@[employer]   Website:  [employer]      Emergency Resource Phone Numbers for [place]:    Crisis Connection:  993.366.4490   [place] Suicide Prevention Line:  6-520-278-[facility] Crisis Program:  973.250.5383   [facility] Crisis Line:  390.894.5177   [facility] Health Crisis Line:  884.544.2949     ___________________________________________  Good breastfeeding resources:    Websites:  www.[employer]  www.[employer].com/blog/  www.llli.org/breastfeeding-info/    Instagram:    @[employer]  [email or web address]    Books:   The Womanly Art of Breastfeeding by [employer]  Breastfeeding Doesn't Need to Suck, by Juli River      For help with  using baby carriers:  https://babywearingtwincities.org/

## 2024-09-17 NOTE — PROGRESS NOTES
Assessment:    Nearly four week old infant gaining weight well on exclusive breastfeeding: slightly more than 1 oz/day  Tendency to slip to shallow latch towards end of feeding and with rapid milk flow;  adjustable with gentle chin pressure  Baby with good suck, with milk transfer appropriate to baby's needs during observed feeding in office today  Mother with milk oversupply due to pumping    Plan:   Use good positioning for deep latch, with baby held close to body and baby's head/shoulders/hips in good alignment.  When in a seated position, use a pillow to help bring baby close to breasts, and stepstool to elevate your knees above hips.    When bringing your baby to your breast, compress your breast vertically and in line with baby's mouth--this will help them to get a larger mouthful of breast and a deeper latch. Babies latch best to the breast by bringing their chin in first, so point your nipple towards baby's nose, tuck the chin in close, and then wait for his mouth to open.  When his mouth opens, bring his head in deeply.  Baby's chin should be snugged deeply in your breast, their upper cheeks should be touching the breast, and their nose just out of the breast.   If you have concerns about baby's nose getting buried in your breast, just move the hand you have behind his head/neck down a little bit--so it is more on his head and shoulders.  This will allow his head to tilt back so that he can nurse comfortably and his nose will come out of  your breast.     If there is pinching or pain, try using a finger to give a little gentle pressure on his chin to help him open more widely and take in more of your breast.  If it is still painful, use a finger to break the suction, remove baby from the breast and try again until there is no pain with nursing.  There is sometimes a little pain when the baby first begins sucking, but after the first few seconds there should be no pain--only a tugging feeling.   You can use  "a rolled burp cloth or blanket under your breast for support, so that you do not need to hold your breast up for the whole feeding.  Another idea is using a scarf or long piece of cloth around your neck and beneath your breast to make a sort of \"breast sling.\"   Continue to nurse baby on cue, 8-12 times each day.  Once your baby has returned to their birthweight, you can trust them to awaken when they need to eat--you do not need to awaken them on a schedule.  When you nurse, feed on one side until baby finishes swallowing.  Once swallowing slows, use breast compression to encourage more swallowing, but once there is no more active swallowing, and baby is either sleeping, coming off the breast, or just \"nibbling,\" it is OK to use a finger to take baby off the breast and move to the other breast.  Do the same on the other side.  Offer both breasts at each feeding.  It is more important to watch the baby than the clock!  If he takes one side you do not need to pump the other.  Keven needs about 25 oz of milk each day to grow well.  If he nurses at home as he did in the office today, about 10 times/day, he is getting plenty of milk and does not need extra in bottles.  You can certainly give some bottles if you choose to--2.5 - 3 oz is a good amount to give.  Continue to use the paced feeding method.  You are currently making much more milk than Keven needs, so you can decrease your pumping.  Choose the two pumping sessions that you most want to drop, and instead of pumping until 10 oz come each time, tomorrow stop after 9 oz.  Then the next day drop back by another ounce, and then another, until you can comfortably drop those pumping sessions entirely, one by one.  Then you can just pump once daily to have the milk you need for Keven's bottles for that day.  Continue using the All-Purpose Nipple Ointment until your nipples are comfortable, and then stop.  Also consider stopping using the silver nipple caps, because " "they can be too tight on your breasts and cause nipple swelling.  Consider connecting with a therapist to support you during the postpartum time, and for help with processing your unexpected .  Adding a supplement of fish oil, 1000 mg/day, can also be helpful.  Aim for a supplement that contains more EPA than DHA fish oil;  You might want to consider one that says \"no fish burps\" or something similar!   Follow up with lactation as needed, and pediatric provider as planned.  Inher[facility] can be used for brief questions, but it's important to know that messages are not seen Friday through . If urgent help is needed, Monday through Friday you can call [phone] and one of our lactation consultants will get the message and respond; if you need a rapid response over a weekend or holiday, it is best to call your on-call maternity or pediatric provider.  Please feel free to schedule a return visit if the concern is more detailed;  telephone visits are also an option if you don't feel you need to be seen in person.       Subjective: Sherrill is here today because she feels that baby Keven often has a shallow latch, which is painful. Sometimes is able to adjust and find a comfortable position, which works well, but feels her large breasts make it difficult to get Keven comfortably positioned.   Baby recently was treated for thrush.  At the same time Sherrill had some nipple pain and swelling for which she was using silver nipple caps;  she was treated with APNO and stopped using silver nipple caps, now is feeling much more comfortable.  Sherrill has been pumping/bottling at night due to recent nipple pain.  Pumping twice at night and once during the day, yielding 7 - 12 oz each session, using BitPass wearable pump--is pleased with this. Baby takes about 3 bottles/day of about 3 oz each.    Sherrill is vaccinated for Covid-19 and has received one booster.    Hospital Course: Elective induction over about 36 hours;  "  for arrest of progress.   Seen by IBCLC for routine support.    Mother's Relevant Med/Surg History: Asthma, infertility x 18 months; PCOS; depression/anxiety    Breastfeeding Goals: continued exclusive breastfeeding    Previous Breastfeeding Experience: first baby    Infant's name: Keven  Infant's bday: 24  Gestational age: 39w2d  Infant's birth weight: 8 # 9.6 oz   Discharge weight: 8 # 5.2 oz     Pediatric Provider: [facility] Lalitha Mix, [facility]  Recent weights:  24:  8 # 8.1 oz  9/10/24:  8 # 14 oz      Peq Lactation Visit Questionnaire    2024  1:56 PM CDT - Filed by Patient   What is your main concern today? shallow latch   Your baby's first name: keven   Your baby's last name: roemeling   Type of Birth    Your doctor/midwife: moe?   Baby's doctor or nurse practitioner: dr mix\tu   Baby's birthday: 2026   Birth weight: 8lbs 9.6oz   Baby's weight just before leaving the hospital: 8lbs 5.2oz   Baby's most recent weight: 8lbs 15oz   Date: sept 10   How often does your baby eat? 2hours   How long does each feeding last?  15_-30   How much of the time does your baby take both breasts when nursing? 50   Can you hear the baby swallowing during nursing? Yes   How many times does your baby feed in 24 hours? 10- 12   How many times does your baby urinate (pee) in 24 hours? 8   How many stools (poops) does your baby have in 24 hours? 6   Describe the color and consistency of the poop: seedy yelloq   Do you give your baby extra milk in addition to or instead of breastfeeding? Breastmilk   How much extra do you usually give? 6-10   How do you give extra milk? Bottle   Are you pumping your breasts? Yes   How often? 2-3 daily   How much is pumped? 25-30   When you were pregnant did your breasts grow larger? Yes   Did your areola (the dark area around your nipple) grow larger or darker? Yes   Did you notice your breasts fill when your baby was 3-5 days old? Yes   Have you had  "any breast surgeries? No   Please select any of the following medical conditions you have been previously diagnosed with or are currently being treated for: Polycystic Ovarian Syndrome;  Depression;  Anxiety   What else would you like the lactation consultant to know?           Objective/Physical exam:   Her nipples are everted, the areola is compressible, the breast is soft and full.     Sore nipples: tender, improving   EPDS: 4, with \"never\" marked for question on thoughts of self-harm.  Sherrill is a bit tearful today about breastfeeding stress;  states she has a history of depression and anxiety and has had therapy in past, although not current.  Open to this again.    Assessment of infant: 44.16% Weight for age percentile   Age today: 4 weeks tomorrow  Today's weight: 9 # 6.2 oz   Amount of milk transferred from LEFT side: 1.8 oz  Amount of milk transferred from RIGHT side: 1.6 oz    Baby has full flexion of arms and legs, normal tone, behavior is alert and active, respirations are normal, skin is normal, hydration is normal, jaw is normal size and alignment, palate is normal, frenulum is normal, baby can lateralize tongue, has adequate tongue lift, and tongue can protrude past bottom gum line. Upper labial frenulum is normal.    Suck exam:  Baby has strong, coordinated suck with good tongue cupping    Baby thrush: none    Jaundice: none      Feeding assessment: Baby can hold suction with tongue while at the breast.  Did slip to shallow latch several times, especially towards end of feeding and with rapid milk flow;  this was successfully managed with gentle chin pressure.    Alignment: The baby was flex relaxed. Baby's head was aligned with its trunk. Baby did face mother. Baby was in cross cradle and football positions today.   Areolar Grasp: Baby was able to open mouth widely. Baby's lips were not pursed. Baby's lips did flange outward. Tongue was visible over bottom gum. Baby had complete seal.     Areolar " Compression: Baby made rhythmic motion. There were no clicking or smacking sounds. There was no severe nipple discomfort. Nipples appeared rounded after feeding.  Audible swallowing: Baby made quiet sounds of swallowing: There was an increase in frequency after milk ejection reflex. The milk ejection reflex is normal and milk supply is overabundant.    /68 (BP Location: Right arm, Patient Position: Sitting, Cuff Size: Adult Large)   Breastfeeding Yes   OB History    Para Term  AB Living   1 1 1 0 0 1   SAB IAB Ectopic Multiple Live Births   0 0 0 0 1      # Outcome Date GA Lbr Pete/2nd Weight Sex Type Anes PTL Lv   1 Term 24 39w2d  3.9 kg (8 lb 9.6 oz) M CS-LTranv EPI N GENE      Name: Keven Du      Apgar1: 8  Apgar5: 9       Current Outpatient Medications:     albuterol (PROAIR HFA) 90 mcg/actuation inhaler, [ALBUTEROL (PROAIR HFA) 90 MCG/ACTUATION INHALER] INHALE TWO PUFFS BY MOUTH EVERY 4 HOURS AS NEEDED FOR WHEEZING, AND INHALE TWO PUFFS BEFORE CHOIR, Disp: 8.5 g, Rfl: 0    APNO CREA ointment, Apply topically every hour as needed for skin care., Disp: , Rfl:     cetirizine (ZYRTEC) 10 MG tablet, Take 20 mg by mouth daily, Disp: , Rfl:     Prenatal Vit-Fe Fumarate-FA (PRENATAL MULTIVITAMIN W/IRON) 27-0.8 MG tablet, Take 1 tablet by mouth daily, Disp: , Rfl:     senna-docusate (SENOKOT-S/PERICOLACE) 8.6-50 MG tablet, Take 1 tablet by mouth 2 times daily., Disp: , Rfl:     Vitamin D3 (VITAMIN D, CHOLECALCIFEROL,) 25 mcg (1000 units) tablet, Take 2 tablets by mouth daily, Disp: , Rfl:   Past Medical History:   Diagnosis Date    Asthma     currently uses inhaler    Dislocated elbow 2008    L elbow     Past Surgical History:   Procedure Laterality Date     SECTION N/A 2024    Procedure:  SECTION;  Surgeon: Eneida Roberson DO;  Location: [facility] OR    FINGER SURGERY Right 2010    R pointer finger    HERNIA REPAIR, UMBILICAL  2004    OTHER  SURGICAL HISTORY  2018    wisdom teeth removal     Family History   Problem Relation Age of Onset    Asthma Mother     Asthma Brother     Asthma Maternal Grandmother     Cancer Maternal Grandfather         skin cancer       Time spent:  Chart review/prechartin min prior to day of service  Face-to-face visit:   53 min   Documentation:  12 min   Total time spent on day of service: 65 min    MOLLY Mustafa, CNM, IBCLC

## 2024-12-07 ENCOUNTER — HEALTH MAINTENANCE LETTER (OUTPATIENT)
Age: 24
End: 2024-12-07

## 2025-07-22 ENCOUNTER — OFFICE VISIT (OUTPATIENT)
Dept: FAMILY MEDICINE | Facility: CLINIC | Age: 25
End: 2025-07-22
Payer: COMMERCIAL

## 2025-07-22 VITALS
WEIGHT: 280 LBS | BODY MASS INDEX: 48.06 KG/M2 | TEMPERATURE: 97.4 F | OXYGEN SATURATION: 98 % | DIASTOLIC BLOOD PRESSURE: 72 MMHG | SYSTOLIC BLOOD PRESSURE: 132 MMHG | HEART RATE: 100 BPM | RESPIRATION RATE: 20 BRPM

## 2025-07-22 DIAGNOSIS — J45.21 MILD INTERMITTENT ASTHMA WITH EXACERBATION: ICD-10-CM

## 2025-07-22 DIAGNOSIS — J45.21 MILD INTERMITTENT ASTHMA WITH EXACERBATION: Primary | ICD-10-CM

## 2025-07-22 DIAGNOSIS — H69.93 DYSFUNCTION OF BOTH EUSTACHIAN TUBES: Primary | ICD-10-CM

## 2025-07-22 DIAGNOSIS — H69.93 DYSFUNCTION OF BOTH EUSTACHIAN TUBES: ICD-10-CM

## 2025-07-22 PROCEDURE — 3075F SYST BP GE 130 - 139MM HG: CPT | Performed by: FAMILY MEDICINE

## 2025-07-22 PROCEDURE — 3078F DIAST BP <80 MM HG: CPT | Performed by: FAMILY MEDICINE

## 2025-07-22 PROCEDURE — 99213 OFFICE O/P EST LOW 20 MIN: CPT | Performed by: FAMILY MEDICINE

## 2025-07-22 RX ORDER — FLUTICASONE PROPIONATE 50 MCG
2 SPRAY, SUSPENSION (ML) NASAL DAILY
Qty: 16 G | Refills: 1 | Status: SHIPPED | OUTPATIENT
Start: 2025-07-22

## 2025-07-22 RX ORDER — ALBUTEROL SULFATE 90 UG/1
2 INHALANT RESPIRATORY (INHALATION) EVERY 6 HOURS PRN
Qty: 8.5 G | Refills: 5 | Status: SHIPPED | OUTPATIENT
Start: 2025-07-22

## 2025-07-22 ASSESSMENT — ASTHMA QUESTIONNAIRES
ACT_TOTALSCORE: 22
QUESTION_4 LAST FOUR WEEKS HOW OFTEN HAVE YOU USED YOUR RESCUE INHALER OR NEBULIZER MEDICATION (SUCH AS ALBUTEROL): NOT AT ALL
QUESTION_3 LAST FOUR WEEKS HOW OFTEN DID YOUR ASTHMA SYMPTOMS (WHEEZING, COUGHING, SHORTNESS OF BREATH, CHEST TIGHTNESS OR PAIN) WAKE YOU UP AT NIGHT OR EARLIER THAN USUAL IN THE MORNING: NOT AT ALL
QUESTION_5 LAST FOUR WEEKS HOW WOULD YOU RATE YOUR ASTHMA CONTROL: WELL CONTROLLED
QUESTION_2 LAST FOUR WEEKS HOW OFTEN HAVE YOU HAD SHORTNESS OF BREATH: ONCE OR TWICE A WEEK
QUESTION_1 LAST FOUR WEEKS HOW MUCH OF THE TIME DID YOUR ASTHMA KEEP YOU FROM GETTING AS MUCH DONE AT WORK, SCHOOL OR AT HOME: A LITTLE OF THE TIME

## 2025-07-22 NOTE — PROGRESS NOTES
{PROVIDER CHARTING PREFERENCE:286819}    Georgia Mccartney is a 24 year old, presenting for the following health issues:  Ear Problem (Left ear pain right ear itching/discharge )        7/22/2025     2:15 PM   Additional Questions   Roomed by Carleen LEMUS   Accompanied by Self and child     Ear Problem    History of Present Illness       Reason for visit:  Ear ache  Symptom onset:  1-3 days ago  Symptoms include:  Headache,ear ache,  Symptom intensity:  Moderate  Symptom progression:  Worsening  Had these symptoms before:  No She is missing 2 dose(s) of medications per week.  She is not taking prescribed medications regularly due to remembering to take.        R ear feels plugged and itchy for a few months.  Feels like she can hear her breathing inside her head.    L ear has sharp pains for the last 3-4 days.  Has a dull ache when touched but no pain when manipulating ear lobe.  No change in hearing.  No drainage from the L ear.    R ear has a lot of wax    Headache is all over and new the last few days.  Has been taking tylenol.  Has been helpful for pain.      Does have seasonal allergies, nothing unusual.  On Zyrtec daily.  Does nto use and nasal sprays.      {ROS Picklists (Optional):137754}      Objective    /72   Pulse 100   Temp 97.4  F (36.3  C) (Tympanic)   Resp 20   Wt 127 kg (280 lb)   SpO2 98%   Breastfeeding Yes   BMI 48.06 kg/m    Body mass index is 48.06 kg/m .  Physical Exam   {Exam List (Optional):843152}    {Diagnostic Test Results (Optional):370175}        Signed Electronically by: Gisselle Potter DO  {Email feedback regarding this note to [email or web address]   :[id number]}   kg/m .  Physical Exam   GENERAL: alert and no distress  EYES: Eyes grossly normal to inspection, PERRL and conjunctivae and sclerae normal  HENT: normal cephalic/atraumatic, both ears: clear effusion, nose and mouth without ulcers or lesions, oropharynx clear, and oral mucous membranes moist  NECK: no adenopathy, no asymmetry, masses, or scars  RESP: lungs clear to auscultation - no rales, rhonchi or wheezes  CV: regular rate and rhythm, normal S1 S2, no S3 or S4, no murmur, click or rub, no peripheral edema  PSYCH: mentation appears normal, affect normal/bright    Epic reviewed        Signed Electronically by: Gisselle Potter DO

## 2025-07-22 NOTE — LETTER
My Asthma Action Plan    Name: Sherrill Du   YOB: 2000  Date: 7/22/2025   My doctor: Gisselle Potter, DO   My clinic: [facility]        My Rescue Medicine: Albuterol (Proair/Ventolin/Proventil HFA) 2-4 puffs EVERY 4 HOURS as needed. Use a spacer if recommended by your provider.   My Asthma Severity:   Intermittent / Exercise Induced  Know your asthma triggers:              GREEN ZONE   Good Control  I feel good  No cough or wheeze  Can work, sleep and play without asthma symptoms       Take your asthma control medicine every day.     If exercise triggers your asthma, take your rescue medication  15 minutes before exercise or sports, and  During exercise if you have asthma symptoms  Spacer to use with inhaler: If you have a spacer, make sure to use it with your inhaler             YELLOW ZONE Getting Worse  I have ANY of these:  I do not feel good  Cough or wheeze  Chest feels tight  Wake up at night   Keep taking your Green Zone medications  Start taking your rescue medicine:  every 20 minutes for up to 1 hour. Then every 4 hours for 24-48 hours.  If you stay in the Yellow Zone for more than 12-24 hours, contact your doctor.  If you do not return to the Green Zone in 12-24 hours or you get worse, start taking your oral steroid medicine if prescribed by your provider.           RED ZONE Medical Alert - Get Help  I have ANY of these:  I feel awful  Medicine is not helping  Breathing getting harder  Trouble walking or talking  Nose opens wide to breathe       Take your rescue medicine NOW  If your provider has prescribed an oral steroid medicine, start taking it NOW  Call your doctor NOW  If you are still in the Red Zone after 20 minutes and you have not reached your doctor:  Take your rescue medicine again and  Call 911 or go to the emergency room right away    See your regular doctor within 2 weeks of an Emergency Room or Urgent Care visit for follow-up treatment.           Annual Reminders:  Meet with Asthma Educator,  Flu Shot in the Fall, consider Pneumonia Vaccination for patients with asthma (aged 19 and older).    Pharmacy:    [id number] - [place], [place] - [address]  [facility] [place], [place] - [address] [id number] - [place], [place] - [address] [facility] [place]  [place], [place] - [address]    Electronically signed by Gisselle Potter DO   Date: 07/22/25                    Asthma Triggers  How To Control Things That Make Your Asthma Worse    Triggers are things that make your asthma worse.  Look at the list below to help you find your triggers and   what you can do about them. You can help prevent asthma flare-ups by staying away from your triggers.      Trigger                                                          What you can do   Cigarette Smoke  Tobacco smoke can make asthma worse. Do not allow smoking in your home, car or around you.  Be sure no one smokes at a child s day care or school.  If you smoke, ask your health care provider for ways to help you quit.  Ask family members to quit too.  Ask your health care provider for a referral to Quit Plan to help you quit smoking, or call 1-596-730-PLAN.     Colds, Flu, Bronchitis  These are common triggers of asthma. Wash your hands often.  Don t touch your eyes, nose or mouth.  Get a flu shot every year.     Dust Mites  These are tiny bugs that live in cloth or carpet. They are too small to see. Wash sheets and blankets in hot water every week.   Encase pillows and mattress in dust mite proof covers.  Avoid having carpet if you can. If you have carpet, vacuum weekly.   Use a dust mask and HEPA vacuum.   Pollen and Outdoor Mold  Some people are allergic to trees, grass, or weed pollen, or molds. Try to keep your windows closed.  Limit time out doors when pollen count is high.   Ask you health  care provider about taking medicine during allergy season.     Animal Dander  Some people are allergic to skin flakes, urine or saliva from pets with fur or feathers. Keep pets with fur or feathers out of your home.    If you can t keep the pet outdoors, then keep the pet out of your bedroom.  Keep the bedroom door closed.  Keep pets off cloth furniture and away from stuffed toys.     Mice, Rats, and Cockroaches  Some people are allergic to the waste from these pests.   Cover food and garbage.  Clean up spills and food crumbs.  Store grease in the refrigerator.   Keep food out of the bedroom.   Indoor Mold  This can be a trigger if your home has high moisture. Fix leaking faucets, pipes, or other sources of water.   Clean moldy surfaces.  Dehumidify basement if it is damp and smelly.   Smoke, Strong Odors, and Sprays  These can reduce air quality. Stay away from strong odors and sprays, such as perfume, powder, hair spray, paints, smoke incense, paint, cleaning products, candles and new carpet.   Exercise or Sports  Some people with asthma have this trigger. Be active!  Ask your doctor about taking medicine before sports or exercise to prevent symptoms.    Warm up for 5-10 minutes before and after sports or exercise.     Other Triggers of Asthma  Cold air:  Cover your nose and mouth with a scarf.  Sometimes laughing or crying can be a trigger.  Some medicines and food can trigger asthma.